# Patient Record
Sex: MALE | Race: ASIAN | Employment: OTHER | URBAN - METROPOLITAN AREA
[De-identification: names, ages, dates, MRNs, and addresses within clinical notes are randomized per-mention and may not be internally consistent; named-entity substitution may affect disease eponyms.]

---

## 2020-03-13 ENCOUNTER — HOSPITAL ENCOUNTER (OUTPATIENT)
Dept: ULTRASOUND IMAGING | Age: 80
Discharge: HOME | End: 2020-03-13
Payer: MEDICARE

## 2020-03-13 DIAGNOSIS — R14.0 ABDOMINAL DISTENSION: ICD-10-CM

## 2020-03-13 PROCEDURE — 76700 US EXAM ABDOM COMPLETE: CPT

## 2020-03-17 ENCOUNTER — HOSPITAL ENCOUNTER (OUTPATIENT)
Dept: CT IMAGING | Age: 80
Discharge: HOME | End: 2020-03-17
Attending: INTERNAL MEDICINE
Payer: MEDICARE

## 2020-03-17 DIAGNOSIS — R10.13 ABDOMINAL PAIN, EPIGASTRIC: ICD-10-CM

## 2020-03-17 LAB — CREAT BLD-MCNC: 1.3 MG/DL (ref 0.6–1.3)

## 2020-03-17 PROCEDURE — 74160 CT ABDOMEN W/CONTRAST: CPT

## 2020-03-17 PROCEDURE — 82565 ASSAY OF CREATININE: CPT

## 2020-03-17 PROCEDURE — 74011636320 HC RX REV CODE- 636/320: Performed by: RADIOLOGY

## 2020-03-17 RX ADMIN — IOPAMIDOL 100 ML: 755 INJECTION, SOLUTION INTRAVENOUS at 11:40

## 2020-06-19 ENCOUNTER — OFFICE VISIT (OUTPATIENT)
Dept: OTOLARYNGOLOGY | Facility: CLINIC | Age: 80
End: 2020-06-19
Payer: MEDICARE

## 2020-06-19 VITALS
WEIGHT: 163 LBS | SYSTOLIC BLOOD PRESSURE: 102 MMHG | BODY MASS INDEX: 24.71 KG/M2 | HEIGHT: 68 IN | HEART RATE: 80 BPM | DIASTOLIC BLOOD PRESSURE: 62 MMHG

## 2020-06-19 DIAGNOSIS — Z96.22 HISTORY OF PLACEMENT OF EAR TUBES: ICD-10-CM

## 2020-06-19 DIAGNOSIS — H92.01 RIGHT EAR PAIN: ICD-10-CM

## 2020-06-19 DIAGNOSIS — H91.90 HEARING LOSS, UNSPECIFIED HEARING LOSS TYPE, UNSPECIFIED LATERALITY: ICD-10-CM

## 2020-06-19 DIAGNOSIS — H93.8X1 BLOOD CLOT OF RIGHT EAR: Primary | ICD-10-CM

## 2020-06-19 PROCEDURE — 99999 PR PBB SHADOW E&M-NEW PATIENT-LVL IV: ICD-10-PCS | Mod: PBBFAC,,, | Performed by: OTOLARYNGOLOGY

## 2020-06-19 PROCEDURE — 99203 PR OFFICE/OUTPT VISIT, NEW, LEVL III, 30-44 MIN: ICD-10-PCS | Mod: S$PBB,,, | Performed by: OTOLARYNGOLOGY

## 2020-06-19 PROCEDURE — 99204 OFFICE O/P NEW MOD 45 MIN: CPT | Mod: PBBFAC | Performed by: OTOLARYNGOLOGY

## 2020-06-19 PROCEDURE — 99203 OFFICE O/P NEW LOW 30 MIN: CPT | Mod: S$PBB,,, | Performed by: OTOLARYNGOLOGY

## 2020-06-19 PROCEDURE — 99999 PR PBB SHADOW E&M-NEW PATIENT-LVL IV: CPT | Mod: PBBFAC,,, | Performed by: OTOLARYNGOLOGY

## 2020-06-19 RX ORDER — EZETIMIBE 10 MG/1
10 TABLET ORAL DAILY
COMMUNITY

## 2020-06-19 RX ORDER — ASPIRIN 325 MG
325 TABLET ORAL DAILY
COMMUNITY

## 2020-06-19 RX ORDER — BUPROPION HYDROCHLORIDE 150 MG/1
150 TABLET ORAL DAILY
COMMUNITY

## 2020-06-19 RX ORDER — LOSARTAN POTASSIUM 25 MG/1
25 TABLET ORAL DAILY
COMMUNITY

## 2020-06-19 RX ORDER — METOPROLOL TARTRATE 25 MG/1
12.5 TABLET, FILM COATED ORAL DAILY
COMMUNITY

## 2020-06-19 RX ORDER — OXYBUTYNIN CHLORIDE 5 MG/1
5 TABLET ORAL DAILY
COMMUNITY

## 2020-06-19 RX ORDER — ESCITALOPRAM OXALATE 20 MG/1
20 TABLET ORAL DAILY
COMMUNITY

## 2020-06-19 RX ORDER — ATORVASTATIN CALCIUM 40 MG/1
40 TABLET, FILM COATED ORAL DAILY
COMMUNITY

## 2020-06-19 RX ORDER — CLOPIDOGREL BISULFATE 75 MG/1
75 TABLET ORAL DAILY
COMMUNITY

## 2020-06-19 NOTE — PATIENT INSTRUCTIONS
1.  Keep ears dry.  2.  No hearing aid in right ear.  3.  No Q-tips or self manipulation to ears.  4.  Appointment to Dr. Vivek Alvarado or Teofilo Iqbal.  Appointment with Dr. Iqbal on June 24, 2020 at 9:00 am.

## 2020-06-19 NOTE — PROGRESS NOTES
OTOLARYNGOLOGY CLINIC    Subjective:       Patient ID: Michi Mac is a 80 y.o. male.    Chief Complaint: Other (Right ear bleeding and pain.)    HPI  Patient is a 80 year old male from [place] with his PCP in [facility] with the first time visit to [facility].  Patient complains of bleeding from the right ear last pm with pain to the right ear.  Patient also states that he has a hearing loss, problems with flying with the placement of middle ear ventilation tubes to allow him to fly (placement approximately 6 months ago).  This was done to help him equalize the air pressure with airplane flying since he travels frequently.  Patient takes Plavix 75 mg daily and  per day.  Patients hearing problem has been for over 5 years.  One year ago he had an ear infection: treated and resolved.  Pain to the right ear is 4 on a scale from 0-10.  Patient wears hearing aids to both ears.  Cigarettes: 20 pack year history.  No T&A.      History reviewed. No pertinent past medical history.    History reviewed. No pertinent surgical history.      Current Outpatient Medications:     aspirin 325 MG tablet, Take 325 mg by mouth once daily., Disp: , Rfl:     atorvastatin (LIPITOR) 40 MG tablet, Take 40 mg by mouth once daily., Disp: , Rfl:     buPROPion (WELLBUTRIN XL) 150 MG TB24 tablet, Take 150 mg by mouth once daily., Disp: , Rfl:     clopidogreL (PLAVIX) 75 mg tablet, Take 75 mg by mouth once daily., Disp: , Rfl:     escitalopram oxalate (LEXAPRO) 20 MG tablet, Take 20 mg by mouth once daily., Disp: , Rfl:     ezetimibe (ZETIA) 10 mg tablet, Take 10 mg by mouth once daily., Disp: , Rfl:     losartan (COZAAR) 25 MG tablet, Take 25 mg by mouth once daily., Disp: , Rfl:     metoprolol tartrate (LOPRESSOR) 25 MG tablet, Take 25 mg by mouth 2 (two) times daily. 1/2 tab qd, Disp: , Rfl:     oxybutynin (DITROPAN) 5 MG Tab, Take 5 mg by mouth 3 (three) times daily., Disp: , Rfl:     Review of patient's allergies  indicates:  No Known Allergies    Social History     Socioeconomic History    Marital status:      Spouse name: Not on file    Number of children: Not on file    Years of education: Not on file    Highest education level: Not on file   Occupational History    Not on file   Social Needs    Financial resource strain: Not on file    Food insecurity     Worry: Not on file     Inability: Not on file    Transportation needs     Medical: Not on file     Non-medical: Not on file   Tobacco Use    Smoking status: Former Smoker     Years: 45.00     Types: Cigarettes   Substance and Sexual Activity    Alcohol use: Yes     Alcohol/week: 1.0 standard drinks     Types: 1 Glasses of wine per week    Drug use: Not on file    Sexual activity: Not on file   Lifestyle    Physical activity     Days per week: Not on file     Minutes per session: Not on file    Stress: Not on file   Relationships    Social connections     Talks on phone: Not on file     Gets together: Not on file     Attends Orthodoxy service: Not on file     Active member of club or organization: Not on file     Attends meetings of clubs or organizations: Not on file     Relationship status: Not on file   Other Topics Concern    Not on file   Social History Narrative    Not on file       History reviewed. No pertinent family history.    Review of Systems   Constitutional: Negative for fatigue and fever.   HENT: Positive for ear discharge, ear pain and hearing loss. Negative for congestion, facial swelling, nosebleeds, postnasal drip, rhinorrhea, sinus pressure, sneezing, sore throat, tinnitus and voice change.         Middle ear ventilation tubes in both ears.  Bleeding from the right ear with bright red blood and old partially clotted blood.   Eyes: Negative for discharge.   Respiratory: Negative for cough.    Cardiovascular: Negative for chest pain.   Gastrointestinal: Negative for vomiting.   Genitourinary: Negative for difficulty urinating.    Musculoskeletal: Negative for neck pain.   Skin: Negative for rash.   Neurological: Negative for headaches.   Hematological: Negative for adenopathy.   Psychiatric/Behavioral: Negative for sleep disturbance.       Objective:     Physical Exam  Constitutional:       Appearance: He is well-developed and well-groomed.   HENT:      Head: Normocephalic and atraumatic.      Right Ear: Hearing and external ear normal.      Left Ear: Hearing, ear canal and external ear normal.      Ears:        Nose: Nose normal. No nasal deformity, septal deviation or rhinorrhea.      Mouth/Throat:      Mouth: No oral lesions.      Pharynx: Uvula midline. No oropharyngeal exudate, posterior oropharyngeal erythema or uvula swelling.   Eyes:      General:         Right eye: No discharge.         Left eye: No discharge.      Extraocular Movements: Extraocular movements intact.      Pupils: Pupils are equal, round, and reactive to light.   Neck:      Musculoskeletal: Normal range of motion and neck supple.      Thyroid: No thyromegaly.   Pulmonary:      Effort: Pulmonary effort is normal.   Musculoskeletal: Normal range of motion.   Lymphadenopathy:      Cervical: No cervical adenopathy.   Skin:     General: Skin is warm.   Neurological:      Mental Status: He is alert and oriented to person, place, and time.   Psychiatric:         Behavior: Behavior is cooperative.          Assessment & Plan:         Problem List Items Addressed This Visit     None      Visit Diagnoses     Blood clot of right ear    -  Primary    History of placement of ear tubes        Hearing loss, unspecified hearing loss type, unspecified laterality        Right ear pain            Patient Instructions   1.  Keep ears dry.  2.  No hearing aid in right ear.  3.  No Q-tips or self manipulation to ears.  4.  Appointment to Dr. Vivek Alvarado or Teofilo Iqbal.  Appointment with Dr. Iqbal on June 24, 2020 at 9:00 am.

## 2020-06-24 ENCOUNTER — OFFICE VISIT (OUTPATIENT)
Dept: OTOLARYNGOLOGY | Facility: CLINIC | Age: 80
End: 2020-06-24
Payer: MEDICARE

## 2020-06-24 ENCOUNTER — TELEPHONE (OUTPATIENT)
Dept: OTOLARYNGOLOGY | Facility: CLINIC | Age: 80
End: 2020-06-24

## 2020-06-24 VITALS — WEIGHT: 160 LBS | BODY MASS INDEX: 24.33 KG/M2

## 2020-06-24 DIAGNOSIS — H69.93 CHRONIC DYSFUNCTION OF BOTH EUSTACHIAN TUBES: ICD-10-CM

## 2020-06-24 DIAGNOSIS — H93.8X1: Primary | ICD-10-CM

## 2020-06-24 DIAGNOSIS — H91.13 PRESBYCUSIS OF BOTH EARS: ICD-10-CM

## 2020-06-24 PROCEDURE — 99999 PR PBB SHADOW E&M-EST. PATIENT-LVL II: ICD-10-PCS | Mod: PBBFAC,,, | Performed by: OTOLARYNGOLOGY

## 2020-06-24 PROCEDURE — 99999 PR PBB SHADOW E&M-EST. PATIENT-LVL II: CPT | Mod: PBBFAC,,, | Performed by: OTOLARYNGOLOGY

## 2020-06-24 PROCEDURE — 99214 OFFICE O/P EST MOD 30 MIN: CPT | Mod: S$PBB,,, | Performed by: OTOLARYNGOLOGY

## 2020-06-24 PROCEDURE — 99214 PR OFFICE/OUTPT VISIT, EST, LEVL IV, 30-39 MIN: ICD-10-PCS | Mod: S$PBB,,, | Performed by: OTOLARYNGOLOGY

## 2020-06-24 PROCEDURE — 99212 OFFICE O/P EST SF 10 MIN: CPT | Mod: PBBFAC | Performed by: OTOLARYNGOLOGY

## 2020-06-24 RX ORDER — CIPROFLOXACIN AND DEXAMETHASONE 3; 1 MG/ML; MG/ML
3 SUSPENSION/ DROPS AURICULAR (OTIC) 2 TIMES DAILY
Qty: 7.5 ML | Refills: 0 | Status: SHIPPED | OUTPATIENT
Start: 2020-06-24 | End: 2020-07-04

## 2020-06-24 NOTE — TELEPHONE ENCOUNTER
----- Message from Carolyn Alvarado sent at 6/24/2020  2:37 PM CDT -----  Regarding: speak with nurse  Contact: patient wife  [phone]-please call above patient wife need to speak with the nurse waiting on a call back thanks.

## 2020-06-24 NOTE — PROGRESS NOTES
Subjective:      Michi Mac is a 80 y.o. male who was self-referred for ear bleeding.    Mr. Mac present to clinic for the evaluation of ear bleeding.  He reports the bleeding from the right ear started last week and he seen by  on 6/19 for this problem.  Dr. Plaza instructed the patient to keep ears dry, No hearing aid in right ear, No Q-tips or self manipulation to ears and to follow-up Dr. Iqbal today.     Today he denies any more bleeding from the ear.  His pain is a 3-4/10 from the right ear.  He has a history of bilateral PE tubes that were placed by his ENT in [place] about 6 months ago for issues he was having due to frequent flying.       Past Medical History  He has no past medical history on file.    Past Surgical History  He has no past surgical history on file.    Family History  His family history is not on file.    Social History  He reports that he has quit smoking. His smoking use included cigarettes. He quit after 45.00 years of use. He does not have any smokeless tobacco history on file. He reports current alcohol use of about 1.0 standard drinks of alcohol per week.    Allergies  He has No Known Allergies.    Medications  He has a current medication list which includes the following prescription(s): aspirin, atorvastatin, bupropion, clopidogrel, escitalopram oxalate, ezetimibe, losartan, metoprolol tartrate, oxybutynin, and ciprodex.    Review of Systems   Constitutional: Negative for activity change, appetite change, chills and fever.   HENT: Positive for ear pain and hearing loss. Negative for dental problem, drooling, facial swelling, mouth sores, sinus pressure, sinus pain, tinnitus, trouble swallowing and voice change.    Eyes: Negative for pain, discharge, redness and itching.   Respiratory: Negative for cough, chest tightness, shortness of breath and wheezing.    Cardiovascular: Negative for chest pain.   Gastrointestinal: Negative for nausea and vomiting.    Endocrine: Negative for cold intolerance and heat intolerance.   Neurological: Negative for dizziness, syncope and light-headedness.   Hematological: Negative for adenopathy.   Psychiatric/Behavioral: Negative for confusion and dysphoric mood. The patient is not nervous/anxious.           Objective:     Wt 72.6 kg (160 lb)   BMI 24.33 kg/m²      Constitutional:   He is oriented to person, place, and time. Vital signs are normal. He appears well-developed and well-nourished. He appears alert. He is cooperative.  Non-toxic appearance. Normal speech.      Head:  Normocephalic and atraumatic. Head is without abrasion, without contusion, without laceration, without right periorbital erythema and without left periorbital erythema. Facial strength is normal.      Ears:    Right Ear: No lacerations. No drainage, swelling or tenderness. No foreign bodies. No mastoid tenderness. Tympanic membrane mobility is normal. No PE tube. No hemotympanum. Decreased hearing is noted.   Left Ear: No lacerations. No drainage, swelling or tenderness. No foreign bodies. No mastoid tenderness. Tympanic membrane is not scarred, not erythematous and not retracted. Tympanic membrane mobility is normal.  No PE tube. No hemotympanum. Decreased hearing is noted.   Ears:      Nose:  No rhinorrhea or nose lacerations. Turbinates normal, no turbinate masses and no turbinate hypertrophy.  Right sinus exhibits no maxillary sinus tenderness and no frontal sinus tenderness. Left sinus exhibits no maxillary sinus tenderness and no frontal sinus tenderness.     Neck:  Trachea normal, phonation normal and full range of motion with neck supple.     Pulmonary/Chest:   Effort normal, breath sounds normal and effort and breath sounds normal.     Psychiatric:   He has a normal mood and affect. His speech is normal and behavior is normal. His mood appears not anxious.     Neurological:   He is alert and oriented to person, place, and time.        Procedure    None    Data Reviewed    No results found for: WBC  No results found for: PLT   No results found for: CREATININE  No results found for: TSH  No results found for: GLU  No results found for: HGBA1C         Assessment:     1. Blood clot of ear, right         Plan:     I had a long discussion with the patient regarding his condition and the further workup and management options.    It appears that Mr. Mac is having bleeding from the ear (although not active today). An exact source was unable to be visualized under microscope due to large, hard clot lateral to TM.  Multiple attempts to clear the blood clot were made, but given its location and Mr. Mac's sensitivity it was unable to be removed.  Mr. Mac instructed to start Ciprodex drops and follow up with Dr. Iqbal in 2 weeks, however he reports he is returning to [place], [place] tomorrow.  Patient instructed to follow-up with the ENT who placed his tubes when he gets home.  Also instructed to avoid using his right hearing aid until he is seen by his provider.    Diagnoses and all orders for this visit:      Extensive and prolonged discussion >30 min of pt's ear problems done with multiple repetitive questions answered.    Blood clot of ear, right  -     ciprofloxacin-dexamethasone 0.3-0.1% (CIPRODEX) 0.3-0.1 % DrpS; Place 3 drops into the right ear 2 (two) times daily. for 10 days  -     Follow-up with an ENT provider in the next 2 weeks

## 2020-06-26 ENCOUNTER — TELEPHONE (OUTPATIENT)
Dept: OTOLARYNGOLOGY | Facility: CLINIC | Age: 80
End: 2020-06-26

## 2020-06-26 NOTE — TELEPHONE ENCOUNTER
Medication called into [facility] pharmacy  [facility], [address] [phone] CIPRO DEX 0.3-0.1% Ear Drop spoke with Shayna , previously spoke with son there was multiple pharmacy site in his chart the son stated he will call his father and find out which pharmacy he prefer.

## 2020-10-07 ENCOUNTER — HOSPITAL ENCOUNTER (EMERGENCY)
Facility: OTHER | Age: 80
Discharge: HOME | End: 2020-10-08
Attending: EMERGENCY MEDICINE
Payer: MEDICARE

## 2020-10-07 DIAGNOSIS — E86.0 DEHYDRATION: ICD-10-CM

## 2020-10-07 DIAGNOSIS — R55 NEAR SYNCOPE: Primary | ICD-10-CM

## 2020-10-07 DIAGNOSIS — D64.9 ANEMIA, UNSPECIFIED TYPE: ICD-10-CM

## 2020-10-07 DIAGNOSIS — I95.9 HYPOTENSION, UNSPECIFIED HYPOTENSION TYPE: ICD-10-CM

## 2020-10-07 DIAGNOSIS — R42 EPISODIC LIGHTHEADEDNESS: ICD-10-CM

## 2020-10-07 DIAGNOSIS — R73.9 HYPERGLYCEMIA: ICD-10-CM

## 2020-10-07 LAB
ALBUMIN SERPL BCP-MCNC: 3.3 G/DL (ref 3.5–5.2)
ALP SERPL-CCNC: 45 U/L (ref 55–135)
ALT SERPL W/O P-5'-P-CCNC: 40 U/L (ref 10–44)
ANION GAP SERPL CALC-SCNC: 10 MMOL/L (ref 8–16)
AST SERPL-CCNC: 27 U/L (ref 10–40)
BASOPHILS # BLD AUTO: 0.03 K/UL (ref 0–0.2)
BASOPHILS NFR BLD: 0.5 % (ref 0–1.9)
BILIRUB SERPL-MCNC: 0.8 MG/DL (ref 0.1–1)
BUN SERPL-MCNC: 34 MG/DL (ref 8–23)
CALCIUM SERPL-MCNC: 8.4 MG/DL (ref 8.7–10.5)
CHLORIDE SERPL-SCNC: 108 MMOL/L (ref 95–110)
CO2 SERPL-SCNC: 21 MMOL/L (ref 23–29)
CREAT SERPL-MCNC: 1.8 MG/DL (ref 0.5–1.4)
DIFFERENTIAL METHOD: ABNORMAL
EOSINOPHIL # BLD AUTO: 0.1 K/UL (ref 0–0.5)
EOSINOPHIL NFR BLD: 1.5 % (ref 0–8)
ERYTHROCYTE [DISTWIDTH] IN BLOOD BY AUTOMATED COUNT: 13.1 % (ref 11.5–14.5)
EST. GFR  (AFRICAN AMERICAN): 40 ML/MIN/1.73 M^2
EST. GFR  (NON AFRICAN AMERICAN): 34 ML/MIN/1.73 M^2
GLUCOSE SERPL-MCNC: 182 MG/DL (ref 70–110)
HCT VFR BLD AUTO: 38.6 % (ref 40–54)
HGB BLD-MCNC: 12.6 G/DL (ref 14–18)
IMM GRANULOCYTES # BLD AUTO: 0.01 K/UL (ref 0–0.04)
IMM GRANULOCYTES NFR BLD AUTO: 0.2 % (ref 0–0.5)
LYMPHOCYTES # BLD AUTO: 1.3 K/UL (ref 1–4.8)
LYMPHOCYTES NFR BLD: 23.4 % (ref 18–48)
MCH RBC QN AUTO: 30.4 PG (ref 27–31)
MCHC RBC AUTO-ENTMCNC: 32.6 G/DL (ref 32–36)
MCV RBC AUTO: 93 FL (ref 82–98)
MONOCYTES # BLD AUTO: 0.5 K/UL (ref 0.3–1)
MONOCYTES NFR BLD: 9.6 % (ref 4–15)
NEUTROPHILS # BLD AUTO: 3.6 K/UL (ref 1.8–7.7)
NEUTROPHILS NFR BLD: 64.8 % (ref 38–73)
NRBC BLD-RTO: 0 /100 WBC
PLATELET # BLD AUTO: 142 K/UL (ref 150–350)
PMV BLD AUTO: 10 FL (ref 9.2–12.9)
POCT GLUCOSE: 126 MG/DL (ref 70–110)
POTASSIUM SERPL-SCNC: 4.4 MMOL/L (ref 3.5–5.1)
PROT SERPL-MCNC: 6.2 G/DL (ref 6–8.4)
RBC # BLD AUTO: 4.15 M/UL (ref 4.6–6.2)
SODIUM SERPL-SCNC: 139 MMOL/L (ref 136–145)
TROPONIN I SERPL DL<=0.01 NG/ML-MCNC: 0.01 NG/ML (ref 0–0.03)
WBC # BLD AUTO: 5.51 K/UL (ref 3.9–12.7)

## 2020-10-07 PROCEDURE — 25000003 PHARM REV CODE 250: Performed by: EMERGENCY MEDICINE

## 2020-10-07 PROCEDURE — 80053 COMPREHEN METABOLIC PANEL: CPT

## 2020-10-07 PROCEDURE — 99285 EMERGENCY DEPT VISIT HI MDM: CPT | Mod: 25

## 2020-10-07 PROCEDURE — 85025 COMPLETE CBC W/AUTO DIFF WBC: CPT

## 2020-10-07 PROCEDURE — 93005 ELECTROCARDIOGRAM TRACING: CPT

## 2020-10-07 PROCEDURE — 96361 HYDRATE IV INFUSION ADD-ON: CPT

## 2020-10-07 PROCEDURE — 93010 ELECTROCARDIOGRAM REPORT: CPT | Mod: ,,, | Performed by: INTERNAL MEDICINE

## 2020-10-07 PROCEDURE — 96360 HYDRATION IV INFUSION INIT: CPT

## 2020-10-07 PROCEDURE — 84484 ASSAY OF TROPONIN QUANT: CPT

## 2020-10-07 PROCEDURE — 93010 EKG 12-LEAD: ICD-10-PCS | Mod: ,,, | Performed by: INTERNAL MEDICINE

## 2020-10-07 PROCEDURE — 82962 GLUCOSE BLOOD TEST: CPT

## 2020-10-07 RX ADMIN — SODIUM CHLORIDE 1000 ML: 0.9 INJECTION, SOLUTION INTRAVENOUS at 10:10

## 2020-10-07 RX ADMIN — SODIUM CHLORIDE 500 ML: 0.9 INJECTION, SOLUTION INTRAVENOUS at 10:10

## 2020-10-08 VITALS
RESPIRATION RATE: 17 BRPM | SYSTOLIC BLOOD PRESSURE: 133 MMHG | BODY MASS INDEX: 24.25 KG/M2 | DIASTOLIC BLOOD PRESSURE: 67 MMHG | HEIGHT: 68 IN | TEMPERATURE: 98 F | OXYGEN SATURATION: 97 % | WEIGHT: 160 LBS | HEART RATE: 68 BPM

## 2020-10-08 LAB
ANION GAP SERPL CALC-SCNC: 8 MMOL/L (ref 8–16)
BUN SERPL-MCNC: 31 MG/DL (ref 8–23)
CALCIUM SERPL-MCNC: 8.2 MG/DL (ref 8.7–10.5)
CHLORIDE SERPL-SCNC: 111 MMOL/L (ref 95–110)
CO2 SERPL-SCNC: 21 MMOL/L (ref 23–29)
CREAT SERPL-MCNC: 1.5 MG/DL (ref 0.5–1.4)
EST. GFR  (AFRICAN AMERICAN): 49 ML/MIN/1.73 M^2
EST. GFR  (NON AFRICAN AMERICAN): 43 ML/MIN/1.73 M^2
GLUCOSE SERPL-MCNC: 108 MG/DL (ref 70–110)
POTASSIUM SERPL-SCNC: 4.5 MMOL/L (ref 3.5–5.1)
SODIUM SERPL-SCNC: 140 MMOL/L (ref 136–145)
TROPONIN I SERPL DL<=0.01 NG/ML-MCNC: 0.01 NG/ML (ref 0–0.03)

## 2020-10-08 PROCEDURE — 96361 HYDRATE IV INFUSION ADD-ON: CPT

## 2020-10-08 PROCEDURE — 84484 ASSAY OF TROPONIN QUANT: CPT

## 2020-10-08 PROCEDURE — 80048 BASIC METABOLIC PNL TOTAL CA: CPT

## 2020-10-08 NOTE — ED PROVIDER NOTES
Encounter Date: 10/7/2020    SCRIBE #1 NOTE: I, Daniel Pearce, am scribing for, and in the presence of, Dr. Amin.       History     Chief Complaint   Patient presents with    Hypotension     EMS reports systolic 90, pt responsive and sitting up at home, GCS 15     Time seen by provider: 8:53 PM    This is a 80 y.o. male who presents with complaint of hypotension that occurred approximately one hour ago. The patient was at a restaurant with his family, when he began to feel lightheaded. He reports getting up from being seated and noticing the lightheadedness more prominently. Once at home the patient's son checked his blood pressure and it was 90 systolic. The patient does not check his blood pressure on a daily basis at home. He works out four times a week at a cardiac rehab in [place], where he states an average systolic blood pressure is 110. He denies any recent change or new medications. The only thing different about his day is that he took his daily medications in the afternoon, when he normally takes his medications in the morning. The patient's son was concerned because when EMS was assessing the patient, he looked more sleepy than normal. This is the extent of the patient's complaints at this time. The patient admits to drinking a manoj tonight, while at the restaurant. He admits to having a glass of wine daily. He denies smoking. The patient has had stents placed 15 years ago with no problems since.    The history is provided by the patient and a relative (son).     Review of patient's allergies indicates:   Allergen Reactions    Codeine      Past Medical History:   Diagnosis Date    Hyperlipidemia     Hypertension     Prediabetes      History reviewed. No pertinent surgical history.  History reviewed. No pertinent family history.  Social History     Tobacco Use    Smoking status: Former Smoker     Years: 45.00     Types: Cigarettes    Smokeless tobacco: Never Used   Substance Use Topics     Alcohol use: Yes     Alcohol/week: 1.0 standard drinks     Types: 1 Glasses of wine per week    Drug use: Never     Review of Systems   Constitutional: Negative for chills and fever.   HENT: Negative for congestion and sore throat.    Eyes: Negative for photophobia and redness.   Respiratory: Negative for cough and shortness of breath.    Cardiovascular: Negative for chest pain and palpitations.   Gastrointestinal: Negative for abdominal pain, nausea and vomiting.   Genitourinary: Negative for dysuria.   Musculoskeletal: Negative for back pain.   Skin: Negative for rash.   Neurological: Positive for light-headedness. Negative for weakness and headaches.   Psychiatric/Behavioral: Negative for confusion.       Physical Exam     Initial Vitals   BP Pulse Resp Temp SpO2   10/07/20 2021 10/07/20 2021 10/07/20 2021 10/07/20 2021 10/07/20 2024   99/60 68 18 98.4 °F (36.9 °C) 97 %      MAP       --                Physical Exam    Nursing note and vitals reviewed.  Constitutional: He appears well-developed and well-nourished. He is not diaphoretic. No distress.   HENT:   Head: Normocephalic and atraumatic.   Mouth/Throat: Oropharynx is clear and moist.   Moist mucus membranes. TMs clear and intact bilaterally.    Eyes: Conjunctivae and EOM are normal. Pupils are equal, round, and reactive to light.   Conjunctivae pink, clear, and intact.    Neck: Normal range of motion. Neck supple.   No cervical lymphadenopathy.    Cardiovascular: Normal rate, regular rhythm, S1 normal, S2 normal and normal heart sounds. Exam reveals no gallop and no friction rub.    No murmur heard.  Pulmonary/Chest: Breath sounds normal. No respiratory distress. He has no wheezes. He has no rhonchi. He has no rales.   Lungs clear to auscultation bilaterally.    Abdominal: Soft. Bowel sounds are normal. There is no abdominal tenderness. There is no rebound and no guarding.   No audible bruits.    Musculoskeletal: Normal range of motion. No tenderness  or edema.      Comments: No lower extremity edema.    Lymphadenopathy:     He has no cervical adenopathy.   Neurological: He is alert and oriented to person, place, and time.   Skin: Skin is warm and dry. Capillary refill takes less than 2 seconds. No rash noted. No pallor.   Warm and dry. No skin tenting, rashes, or lesions.     Psychiatric: He has a normal mood and affect. His behavior is normal. Judgment and thought content normal.         ED Course   Procedures  Labs Reviewed   CBC W/ AUTO DIFFERENTIAL - Abnormal; Notable for the following components:       Result Value    RBC 4.15 (*)     Hemoglobin 12.6 (*)     Hematocrit 38.6 (*)     Platelets 142 (*)     All other components within normal limits   COMPREHENSIVE METABOLIC PANEL - Abnormal; Notable for the following components:    CO2 21 (*)     Glucose 182 (*)     BUN, Bld 34 (*)     Creatinine 1.8 (*)     Calcium 8.4 (*)     Albumin 3.3 (*)     Alkaline Phosphatase 45 (*)     eGFR if  40 (*)     eGFR if non  34 (*)     All other components within normal limits   BASIC METABOLIC PANEL - Abnormal; Notable for the following components:    Chloride 111 (*)     CO2 21 (*)     BUN, Bld 31 (*)     Creatinine 1.5 (*)     Calcium 8.2 (*)     eGFR if  49 (*)     eGFR if non  43 (*)     All other components within normal limits   POCT GLUCOSE - Abnormal; Notable for the following components:    POCT Glucose 126 (*)     All other components within normal limits   TROPONIN I   TROPONIN I     EKG Readings: (Independently Interpreted)   Initial Reading: No STEMI. Rhythm: Normal Sinus Rhythm. Heart Rate: 70.   T wave inversions in lead 1 and AVL.     ECG Results          EKG 12-lead (Final result)  Result time 10/08/20 07:59:49    Final result by Interface, Lab In [facility] (10/08/20 07:59:49)                 Narrative:    Test Reason : R42,    Vent. Rate : 070 BPM     Atrial Rate : 070 BPM     P-R Int : 158 ms           QRS Dur : 086 ms      QT Int : 394 ms       P-R-T Axes : 057 026 109 degrees     QTc Int : 425 ms    Normal sinus rhythm  Septal infarct ,age undetermined  T wave abnormality, consider lateral ischemia  Abnormal ECG    Confirmed by Marcellus Coelho MD ([id number]) on 10/8/2020 7:59:41 AM    Referred By: THAO   SELF           Confirmed By:Marcellus Coelho MD                            Imaging Results          X-Ray Chest AP Portable (Final result)  Result time 10/07/20 21:37:35    Final result by Guido Roque MD (10/07/20 21:37:35)                 Impression:      No acute cardiopulmonary process identified.      Electronically signed by: Guido Roque MD  Date:    10/07/2020  Time:    21:37             Narrative:    EXAMINATION:  XR CHEST AP PORTABLE    CLINICAL HISTORY:  Chest Pain;    TECHNIQUE:  Single frontal view of the chest was performed.    COMPARISON:  None    FINDINGS:  Cardiac silhouette is normal in size.  Lungs are symmetrically expanded.  Minimal left basilar atelectasis or scarring is noted.  Otherwise no evidence of focal consolidative process, pneumothorax, or significant pleural effusion.  No acute osseous abnormality identified.                              X-Rays:   Independently Interpreted Readings:   Chest X-Ray: No enlarged cardiac silhouette. No pulmonary edema. No consolidations.     Medical Decision Making:   History:   Old Medical Records: I decided to obtain old medical records.  Independently Interpreted Test(s):   I have ordered and independently interpreted X-rays - see prior notes.  I have ordered and independently interpreted EKG Reading(s) - see prior notes  Clinical Tests:   Lab Tests: Ordered and Reviewed  Radiological Study: Ordered and Reviewed  Medical Tests: Ordered and Reviewed            Scribe Attestation:   Scribe #1: I performed the above scribed service and the documentation accurately describes the services I performed. I attest to the accuracy of the  note.    Attending Attestation:           Physician Attestation for Scribe:  Physician Attestation Statement for Scribe #1: I, Dr. Amin, reviewed documentation, as scribed by Daniel Pearce in my presence, and it is both accurate and complete.         Attending ED Notes:   Emergent evaluation of 80-year-old male with complaint of a near syncopal episode with low blood pressure.  Patient states he took his high blood pressure medications this afternoon instead of in the morning.  Patient did have a small manoj this evening prior to the near syncopal event.  Patient is afebrile, nontoxic-appearing stable vital signs except for hypotension.  Patient is neurovascularly intact without focal neurologic deficits.  Patient denies any associated chest pain or shortness of breath.  Patient has no elevation white blood cell count.  H&H 12.6 and 38.6.  Blood glucose is 126.  No acute findings on CMP except for BUN and creatinine of 34 and 1.8.  Patient states he has no known history of renal insufficiency.  Troponin is 0.015.  No acute findings on EKG.  No acute findings on chest x-ray.  With the patient's permission I spoke with family member, Dr. Hernández regarding all patient's diagnosis, laboratory and diagnostic findings.  After administration of 1 L IV normal saline patient's blood pressure increased to 118/58.  On re-evaluation the patient states he feels well and has no complaints.  Troponin and BMP will be repeated at 12:30 a.m. final disposition and plan of care will go to Dr. Self.     On re-evaluation of the patient's chart patient was discharged by Dr. Self after finding an improvement in BUN and creatinine and no elevation in 2nd troponin.    Patient presentation is low risk for ACS, no indication of any acute cardiac event on EKG, chest x-ray is non-concerning, the patient has no risk factors for PE, workup is benign. No ongoing chest pain while in the Emergency Department.  The patient's second  troponin is negative. Risk stratification with score indicates patient is low risk for MACE within 60 days and unlikely to benefit from hospitalization.  At this point there is no need for emergent hospitalization. I discussed with the pt their risk stratification for chest pain and the need for follow up with cardiology for further testing and likely stress test within 72 hours. The pt understands. I will discharge with symptom control and have them follow up with primary care physician and cardiology for further workup of their pain. The patient is comfortable with this plan of going home this time.            ED Course as of Oct 10 0253   Thu Oct 08, 2020   0116 I was asked to follow-up on a repeat troponin and BMP.  BMP showed improvement the urine creatinine, troponin remained normal.  Will discharge patient.    [MA]      ED Course User Index  [MA] Merlin Self MD            Clinical Impression:     ICD-10-CM ICD-9-CM   1. Near syncope  R55 780.2   2. Episodic lightheadedness  R42 780.4   3. Hypotension, unspecified hypotension type  I95.9 458.9   4. Dehydration  E86.0 276.51   5. Anemia, unspecified type  D64.9 285.9   6. Hyperglycemia  R73.9 790.29                          ED Disposition Condition    Discharge Stable        ED Prescriptions     None        Follow-up Information     Follow up With Specialties Details Why Contact Info    Vignesh Downs MD  In 2 days  [address] [address] [phone]      Vignesh Downs MD  Schedule an appointment as soon as possible for a visit in 3 days For follow-up and re-evaluation [address] [address] [phone]      [facility]  As needed, for any new or worsening symptoms [address] [facility] [address]  [phone]                                       Jim Muir MD  10/07/20 2255       Jim Muir MD  10/10/20 [id number]

## 2020-10-08 NOTE — ED NOTES
Pt arrives to Ed with c/o hypotension. Pt reports being dehydrated. Pt received 500 ml bolus from EMS. Pt answering questions appropriately. Pt placed on BP cycling, pulse ox, and cardiac monitoring. Per Ems pt awake and alert upon arrival. PT reports taking BP medication without taking BP today. Pt denies CP or SOB. Pt 97% on RA

## 2020-10-13 ENCOUNTER — TELEPHONE (OUTPATIENT)
Dept: CARDIOLOGY | Facility: CLINIC | Age: 80
End: 2020-10-13

## 2020-10-13 ENCOUNTER — PATIENT MESSAGE (OUTPATIENT)
Dept: CARDIOLOGY | Facility: CLINIC | Age: 80
End: 2020-10-13

## 2020-10-13 ENCOUNTER — HOSPITAL ENCOUNTER (OUTPATIENT)
Dept: CARDIOLOGY | Facility: HOSPITAL | Age: 80
Discharge: HOME | End: 2020-10-13
Attending: INTERNAL MEDICINE
Payer: MEDICARE

## 2020-10-13 VITALS
SYSTOLIC BLOOD PRESSURE: 132 MMHG | HEIGHT: 68 IN | HEART RATE: 74 BPM | DIASTOLIC BLOOD PRESSURE: 75 MMHG | WEIGHT: 160 LBS | BODY MASS INDEX: 24.25 KG/M2

## 2020-10-13 DIAGNOSIS — I25.10 CORONARY ARTERY DISEASE, ANGINA PRESENCE UNSPECIFIED, UNSPECIFIED VESSEL OR LESION TYPE, UNSPECIFIED WHETHER NATIVE OR TRANSPLANTED HEART: ICD-10-CM

## 2020-10-13 DIAGNOSIS — I25.10 CORONARY ARTERY DISEASE, ANGINA PRESENCE UNSPECIFIED, UNSPECIFIED VESSEL OR LESION TYPE, UNSPECIFIED WHETHER NATIVE OR TRANSPLANTED HEART: Primary | ICD-10-CM

## 2020-10-13 LAB
ASCENDING AORTA: 3.39 CM
AV INDEX (PROSTH): 0.82
AV MEAN GRADIENT: 3 MMHG
AV PEAK GRADIENT: 5 MMHG
AV VALVE AREA: 2.46 CM2
AV VELOCITY RATIO: 0.8
BSA FOR ECHO PROCEDURE: 1.87 M2
CV ECHO LV RWT: 0.28 CM
DOP CALC AO PEAK VEL: 1.1 M/S
DOP CALC AO VTI: 22.85 CM
DOP CALC LVOT AREA: 3 CM2
DOP CALC LVOT DIAMETER: 1.95 CM
DOP CALC LVOT PEAK VEL: 0.88 M/S
DOP CALC LVOT STROKE VOLUME: 56.15 CM3
DOP CALCLVOT PEAK VEL VTI: 18.81 CM
E WAVE DECELERATION TIME: 163.45 MSEC
E/A RATIO: 1.17
E/E' RATIO: 11.87 M/S
ECHO LV POSTERIOR WALL: 0.75 CM (ref 0.6–1.1)
FRACTIONAL SHORTENING: 12 % (ref 28–44)
INTERVENTRICULAR SEPTUM: 0.61 CM (ref 0.6–1.1)
IVRT: 97.05 MSEC
LA MAJOR: 6.3 CM
LA MINOR: 6.79 CM
LA WIDTH: 4.33 CM
LEFT ATRIUM SIZE: 4.7 CM
LEFT ATRIUM VOLUME INDEX: 60.8 ML/M2
LEFT ATRIUM VOLUME: 113.06 CM3
LEFT INTERNAL DIMENSION IN SYSTOLE: 4.64 CM (ref 2.1–4)
LEFT VENTRICLE DIASTOLIC VOLUME INDEX: 72.51 ML/M2
LEFT VENTRICLE DIASTOLIC VOLUME: 134.79 ML
LEFT VENTRICLE MASS INDEX: 66 G/M2
LEFT VENTRICLE SYSTOLIC VOLUME INDEX: 53.5 ML/M2
LEFT VENTRICLE SYSTOLIC VOLUME: 99.44 ML
LEFT VENTRICULAR INTERNAL DIMENSION IN DIASTOLE: 5.29 CM (ref 3.5–6)
LEFT VENTRICULAR MASS: 122.11 G
LV LATERAL E/E' RATIO: 8.9 M/S
LV SEPTAL E/E' RATIO: 17.8 M/S
MV PEAK A VEL: 0.76 M/S
MV PEAK E VEL: 0.89 M/S
MV STENOSIS PRESSURE HALF TIME: 47.4 MS
MV VALVE AREA P 1/2 METHOD: 4.64 CM2
PISA MRMAX VEL: 0.05 M/S
PISA TR MAX VEL: 3.13 M/S
PULM VEIN S/D RATIO: 0.84
PV PEAK D VEL: 0.5 M/S
PV PEAK S VEL: 0.42 M/S
RA MAJOR: 5.43 CM
RA PRESSURE: 3 MMHG
RA WIDTH: 4.81 CM
RIGHT VENTRICULAR END-DIASTOLIC DIMENSION: 3.19 CM
RV TISSUE DOPPLER FREE WALL SYSTOLIC VELOCITY 1 (APICAL 4 CHAMBER VIEW): 9.09 CM/S
SINUS: 3.35 CM
STJ: 2.72 CM
TDI LATERAL: 0.1 M/S
TDI SEPTAL: 0.05 M/S
TDI: 0.08 M/S
TR MAX PG: 39 MMHG
TRICUSPID ANNULAR PLANE SYSTOLIC EXCURSION: 1.7 CM
TV REST PULMONARY ARTERY PRESSURE: 42 MMHG

## 2020-10-13 PROCEDURE — 93306 ECHO (CUPID ONLY): ICD-10-PCS | Mod: 26,,, | Performed by: INTERNAL MEDICINE

## 2020-10-13 PROCEDURE — 93306 TTE W/DOPPLER COMPLETE: CPT | Mod: 26,,, | Performed by: INTERNAL MEDICINE

## 2020-10-13 PROCEDURE — C8929 TTE W OR WO FOL WCON,DOPPLER: HCPCS

## 2020-10-13 PROCEDURE — 63600175 PHARM REV CODE 636 W HCPCS: Performed by: INTERNAL MEDICINE

## 2020-10-13 RX ADMIN — HUMAN ALBUMIN MICROSPHERES AND PERFLUTREN 0.66 MG: 10; .22 INJECTION, SOLUTION INTRAVENOUS at 04:10

## 2020-10-14 ENCOUNTER — TELEPHONE (OUTPATIENT)
Dept: CARDIOLOGY | Facility: CLINIC | Age: 80
End: 2020-10-14

## 2020-10-14 ENCOUNTER — OFFICE VISIT (OUTPATIENT)
Dept: CARDIOLOGY | Facility: CLINIC | Age: 80
End: 2020-10-14
Payer: MEDICARE

## 2020-10-14 VITALS
SYSTOLIC BLOOD PRESSURE: 138 MMHG | DIASTOLIC BLOOD PRESSURE: 70 MMHG | WEIGHT: 164.88 LBS | BODY MASS INDEX: 24.99 KG/M2 | HEART RATE: 73 BPM | HEIGHT: 68 IN

## 2020-10-14 DIAGNOSIS — I95.0 IDIOPATHIC HYPOTENSION: ICD-10-CM

## 2020-10-14 DIAGNOSIS — I10 HTN (HYPERTENSION), BENIGN: ICD-10-CM

## 2020-10-14 DIAGNOSIS — E78.5 DYSLIPIDEMIA: ICD-10-CM

## 2020-10-14 DIAGNOSIS — N18.30 ACUTE RENAL FAILURE WITH ACUTE TUBULAR NECROSIS SUPERIMPOSED ON STAGE 3 CHRONIC KIDNEY DISEASE, UNSPECIFIED WHETHER STAGE 3A OR 3B CKD: ICD-10-CM

## 2020-10-14 DIAGNOSIS — E55.9 VITAMIN D DEFICIENCY DISEASE: ICD-10-CM

## 2020-10-14 DIAGNOSIS — N17.0 ACUTE RENAL FAILURE WITH ACUTE TUBULAR NECROSIS SUPERIMPOSED ON STAGE 3 CHRONIC KIDNEY DISEASE, UNSPECIFIED WHETHER STAGE 3A OR 3B CKD: ICD-10-CM

## 2020-10-14 DIAGNOSIS — Z95.5 STENTED CORONARY ARTERY: ICD-10-CM

## 2020-10-14 DIAGNOSIS — R55 NEAR SYNCOPE: ICD-10-CM

## 2020-10-14 DIAGNOSIS — I25.10 CORONARY ARTERY DISEASE INVOLVING NATIVE CORONARY ARTERY OF NATIVE HEART WITHOUT ANGINA PECTORIS: ICD-10-CM

## 2020-10-14 DIAGNOSIS — I25.5 ISCHEMIC CARDIOMYOPATHY: Primary | ICD-10-CM

## 2020-10-14 PROBLEM — N17.9 ACUTE RENAL FAILURE SUPERIMPOSED ON STAGE 3 CHRONIC KIDNEY DISEASE: Status: ACTIVE | Noted: 2020-10-14

## 2020-10-14 PROBLEM — I95.9 HYPOTENSION: Status: ACTIVE | Noted: 2020-10-14

## 2020-10-14 PROBLEM — I25.2 OLD MI (MYOCARDIAL INFARCTION): Status: ACTIVE | Noted: 2020-10-14

## 2020-10-14 PROCEDURE — 99999 PR PBB SHADOW E&M-EST. PATIENT-LVL IV: ICD-10-PCS | Mod: PBBFAC,,, | Performed by: INTERNAL MEDICINE

## 2020-10-14 PROCEDURE — 99999 PR PBB SHADOW E&M-EST. PATIENT-LVL IV: CPT | Mod: PBBFAC,,, | Performed by: INTERNAL MEDICINE

## 2020-10-14 PROCEDURE — 99204 OFFICE O/P NEW MOD 45 MIN: CPT | Mod: S$PBB,,, | Performed by: INTERNAL MEDICINE

## 2020-10-14 PROCEDURE — 99214 OFFICE O/P EST MOD 30 MIN: CPT | Mod: PBBFAC | Performed by: INTERNAL MEDICINE

## 2020-10-14 PROCEDURE — 99204 PR OFFICE/OUTPT VISIT, NEW, LEVL IV, 45-59 MIN: ICD-10-PCS | Mod: S$PBB,,, | Performed by: INTERNAL MEDICINE

## 2020-10-14 RX ORDER — NITROGLYCERIN 0.4 MG/1
0.4 TABLET SUBLINGUAL EVERY 5 MIN PRN
Qty: 25 TABLET | Refills: 3 | Status: SHIPPED | OUTPATIENT
Start: 2020-10-14 | End: 2020-11-13

## 2020-10-14 RX ORDER — TESTOSTERONE GEL, 1% 10 MG/G
GEL TRANSDERMAL DAILY
COMMUNITY

## 2020-10-14 RX ORDER — PYRIDOXINE HCL (VITAMIN B6) 100 MG
50 TABLET ORAL DAILY
COMMUNITY

## 2020-10-14 RX ORDER — CHOLECALCIFEROL (VITAMIN D3) 25 MCG
1000 TABLET ORAL DAILY
COMMUNITY

## 2020-10-14 RX ORDER — LANOLIN ALCOHOL/MO/W.PET/CERES
100 CREAM (GRAM) TOPICAL DAILY
COMMUNITY

## 2020-10-14 NOTE — PROGRESS NOTES
Subjective:   Patient ID:  Michi Mac is a 80 y.o. male who presents for follow-up of Cardiomyopathy      HPI:  The patient is here for CAD-he was just in ER with hypotension and renal insuff. I saw his son 4 years ago and Dr STARR Mishra asked me to see him before he went back to [place].She had ordered an FARZANEH under my name but I canceled yesterday after seeing resting.He says LVEF was read as 50% 5-7 years ago. PTCA and MI were many years ago.    The patient has no chest pain, SOB, TIA, palpitations, syncope. Patient currently exercises many times per week.BP usually 110/70.        Review of Systems   Constitution: Negative for chills, decreased appetite, diaphoresis, fever, malaise/fatigue, night sweats, weight gain and weight loss.   HENT: Negative for congestion, hoarse voice, nosebleeds, sore throat and tinnitus.    Eyes: Negative for blurred vision, double vision, vision loss in left eye, vision loss in right eye, visual disturbance and visual halos.   Cardiovascular: Positive for near-syncope. Negative for chest pain, claudication, cyanosis, dyspnea on exertion, irregular heartbeat, leg swelling, orthopnea, palpitations, paroxysmal nocturnal dyspnea and syncope.   Respiratory: Negative for cough, hemoptysis, shortness of breath, sleep disturbances due to breathing, snoring, sputum production and wheezing.    Endocrine: Negative for cold intolerance, heat intolerance, polydipsia, polyphagia and polyuria.   Hematologic/Lymphatic: Negative for adenopathy and bleeding problem. Does not bruise/bleed easily.   Skin: Negative for color change, dry skin, flushing, itching, nail changes, poor wound healing, rash, skin cancer, suspicious lesions and unusual hair distribution.   Musculoskeletal: Negative for arthritis, back pain, falls, gout, joint pain, joint swelling, muscle cramps, muscle weakness, myalgias and stiffness.   Gastrointestinal: Negative for abdominal pain, anorexia, change in bowel habit, constipation,  "diarrhea, dysphagia, heartburn, hematemesis, hematochezia, melena and vomiting.   Genitourinary: Negative for decreased libido, dysuria, hematuria, hesitancy and urgency.   Neurological: Positive for light-headedness. Negative for excessive daytime sleepiness, dizziness, focal weakness, headaches, loss of balance, numbness, paresthesias, seizures, sensory change, tremors, vertigo and weakness.   Psychiatric/Behavioral: Negative for altered mental status, depression, hallucinations, memory loss, substance abuse and suicidal ideas. The patient does not have insomnia and is not nervous/anxious.    Allergic/Immunologic: Negative for environmental allergies and hives.       Objective: /70 (BP Location: Left arm, Patient Position: Sitting, BP Method: Medium (Automatic))   Pulse 73   Ht 5' 8" (1.727 m)   Wt 74.8 kg (164 lb 14.5 oz)   BMI 25.07 kg/m²      Physical Exam   Constitutional: He is oriented to person, place, and time. He appears well-developed and well-nourished. No distress.   HENT:   Head: Normocephalic.   Eyes: Pupils are equal, round, and reactive to light. EOM are normal.   Neck: Normal range of motion. No thyromegaly present.   Cardiovascular: Normal rate, regular rhythm, normal heart sounds and intact distal pulses. Exam reveals no gallop and no friction rub.   No murmur heard.  Pulses:       Carotid pulses are 3+ on the right side and 3+ on the left side.       Radial pulses are 3+ on the right side and 3+ on the left side.        Femoral pulses are 3+ on the right side and 3+ on the left side.       Popliteal pulses are 3+ on the right side and 3+ on the left side.        Dorsalis pedis pulses are 3+ on the right side and 3+ on the left side.        Posterior tibial pulses are 3+ on the right side and 3+ on the left side.   Pulmonary/Chest: Effort normal and breath sounds normal. No respiratory distress. He has no wheezes. He has no rales. He exhibits no tenderness.   Abdominal: Soft. He " exhibits no distension and no mass. There is no abdominal tenderness.   Musculoskeletal: Normal range of motion.   Lymphadenopathy:     He has no cervical adenopathy.   Neurological: He is alert and oriented to person, place, and time.   Skin: Skin is warm. He is not diaphoretic. No cyanosis. Nails show no clubbing.   Psychiatric: He has a normal mood and affect. His speech is normal and behavior is normal. Judgment and thought content normal. Cognition and memory are normal.       Assessment:     1. Ischemic cardiomyopathy    2. Coronary artery disease involving native coronary artery of native heart without angina pectoris    3. Stented coronary artery    4. Idiopathic hypotension    5. Dyslipidemia    6. HTN (hypertension), benign    7. Acute renal failure with acute tubular necrosis superimposed on stage 3 chronic kidney disease, unspecified whether stage 3a or 3b CKD        Plan:   Discussed diet , achieving and maintaining ideal body weight, and exercise.   We reviewed meds in detail.  Reassured-Discussed goals, options , plan  Co Q 10 200 mg per day  Metoprolol should be half twice  Omega-3 > 800 mg/d EPA/DHA  Refill NTG every 8-9 months  Michi was seen today for cardiomyopathy.    Diagnoses and all orders for this visit:    Ischemic cardiomyopathy    Coronary artery disease involving native coronary artery of native heart without angina pectoris    Stented coronary artery    Idiopathic hypotension    Dyslipidemia    HTN (hypertension), benign    Acute renal failure with acute tubular necrosis superimposed on stage 3 chronic kidney disease, unspecified whether stage 3a or 3b CKD    Other orders  -     pyridoxine, vitamin B6, (VITAMIN B-6) 100 MG Tab; Take 50 mg by mouth once daily.  -     cyanocobalamin (VITAMIN B-12) 1000 MCG tablet; Take 100 mcg by mouth once daily.  -     vitamin D (VITAMIN D3) 1000 units Tab; Take 1,000 Units by mouth once daily.  -     testosterone (ANDROGEL) 1 % (50 mg/5 gram) GlPk; Apply  topically once daily.            No follow-ups on file.

## 2020-10-14 NOTE — PATIENT INSTRUCTIONS
Discussed diet , achieving and maintaining ideal body weight, and exercise.   We reviewed meds in detail.  Reassured-Discussed goals, options , plan  Co Q 10 200 mg per day  Metoprolol should be half twice  Omega-3 > 800 mg/d EPA/DHA  Refill NTG every 8-9 months

## 2020-10-15 ENCOUNTER — LAB VISIT (OUTPATIENT)
Dept: LAB | Facility: OTHER | Age: 80
End: 2020-10-15
Attending: INTERNAL MEDICINE
Payer: MEDICARE

## 2020-10-15 DIAGNOSIS — I25.5 ISCHEMIC CARDIOMYOPATHY: ICD-10-CM

## 2020-10-15 DIAGNOSIS — I25.10 CORONARY ARTERY DISEASE, ANGINA PRESENCE UNSPECIFIED, UNSPECIFIED VESSEL OR LESION TYPE, UNSPECIFIED WHETHER NATIVE OR TRANSPLANTED HEART: ICD-10-CM

## 2020-10-15 LAB
ALBUMIN SERPL BCP-MCNC: 3.6 G/DL (ref 3.5–5.2)
ALP SERPL-CCNC: 47 U/L (ref 55–135)
ALT SERPL W/O P-5'-P-CCNC: 35 U/L (ref 10–44)
ANION GAP SERPL CALC-SCNC: 8 MMOL/L (ref 8–16)
AST SERPL-CCNC: 24 U/L (ref 10–40)
BILIRUB SERPL-MCNC: 1 MG/DL (ref 0.1–1)
BNP SERPL-MCNC: 158 PG/ML (ref 0–99)
BUN SERPL-MCNC: 28 MG/DL (ref 8–23)
CALCIUM SERPL-MCNC: 8.7 MG/DL (ref 8.7–10.5)
CHLORIDE SERPL-SCNC: 108 MMOL/L (ref 95–110)
CHOLEST SERPL-MCNC: 127 MG/DL (ref 120–199)
CHOLEST/HDLC SERPL: 2.8 {RATIO} (ref 2–5)
CO2 SERPL-SCNC: 24 MMOL/L (ref 23–29)
CREAT SERPL-MCNC: 1.4 MG/DL (ref 0.5–1.4)
EST. GFR  (AFRICAN AMERICAN): 54 ML/MIN/1.73 M^2
EST. GFR  (NON AFRICAN AMERICAN): 46 ML/MIN/1.73 M^2
GLUCOSE SERPL-MCNC: 146 MG/DL (ref 70–110)
HDLC SERPL-MCNC: 46 MG/DL (ref 40–75)
HDLC SERPL: 36.2 % (ref 20–50)
LDLC SERPL CALC-MCNC: 63 MG/DL (ref 63–159)
NONHDLC SERPL-MCNC: 81 MG/DL
POTASSIUM SERPL-SCNC: 4.7 MMOL/L (ref 3.5–5.1)
PROT SERPL-MCNC: 6.8 G/DL (ref 6–8.4)
SODIUM SERPL-SCNC: 140 MMOL/L (ref 136–145)
TRIGL SERPL-MCNC: 90 MG/DL (ref 30–150)
TSH SERPL DL<=0.005 MIU/L-ACNC: 1.31 UIU/ML (ref 0.4–4)

## 2020-10-15 PROCEDURE — 80053 COMPREHEN METABOLIC PANEL: CPT

## 2020-10-15 PROCEDURE — 36415 COLL VENOUS BLD VENIPUNCTURE: CPT

## 2020-10-15 PROCEDURE — 83880 ASSAY OF NATRIURETIC PEPTIDE: CPT

## 2020-10-15 PROCEDURE — 80061 LIPID PANEL: CPT

## 2020-10-15 PROCEDURE — 84443 ASSAY THYROID STIM HORMONE: CPT

## 2020-10-16 ENCOUNTER — PATIENT MESSAGE (OUTPATIENT)
Dept: CARDIOLOGY | Facility: CLINIC | Age: 80
End: 2020-10-16

## 2020-10-16 ENCOUNTER — HOSPITAL ENCOUNTER (OUTPATIENT)
Dept: CARDIOLOGY | Facility: HOSPITAL | Age: 80
Discharge: HOME | End: 2020-10-16
Attending: INTERNAL MEDICINE
Payer: MEDICARE

## 2020-10-16 VITALS — HEIGHT: 68 IN | BODY MASS INDEX: 24.86 KG/M2 | WEIGHT: 164 LBS

## 2020-10-16 DIAGNOSIS — I95.0 IDIOPATHIC HYPOTENSION: ICD-10-CM

## 2020-10-16 DIAGNOSIS — Z95.5 STENTED CORONARY ARTERY: ICD-10-CM

## 2020-10-16 DIAGNOSIS — I25.5 ISCHEMIC CARDIOMYOPATHY: ICD-10-CM

## 2020-10-16 DIAGNOSIS — I25.10 CORONARY ARTERY DISEASE INVOLVING NATIVE CORONARY ARTERY OF NATIVE HEART WITHOUT ANGINA PECTORIS: ICD-10-CM

## 2020-10-16 DIAGNOSIS — R55 NEAR SYNCOPE: ICD-10-CM

## 2020-10-16 LAB
CV STRESS BASE HR: 66 BPM
DIASTOLIC BLOOD PRESSURE: 76 MMHG
END DIASTOLIC INDEX-HIGH: 170 ML/M2
END SYSTOLIC INDEX-HIGH: 70 ML/M2
NUC REST DIASTOLIC VOLUME INDEX: 139
NUC REST EJECTION FRACTION: 30
NUC REST SYSTOLIC VOLUME INDEX: 97
NUC STRESS DIASTOLIC VOLUME INDEX: 135
NUC STRESS EJECTION FRACTION: 38 %
NUC STRESS SYSTOLIC VOLUME INDEX: 83
OHS CV CPX 85 PERCENT MAX PREDICTED HEART RATE MALE: 117
OHS CV CPX MAX PREDICTED HEART RATE: 138
OHS CV CPX PATIENT IS FEMALE: 0
OHS CV CPX PATIENT IS MALE: 1
OHS CV CPX PEAK DIASTOLIC BLOOD PRESSURE: 56 MMHG
OHS CV CPX PEAK HEAR RATE: 93 BPM
OHS CV CPX PEAK RATE PRESSURE PRODUCT: NORMAL
OHS CV CPX PEAK SYSTOLIC BLOOD PRESSURE: 109 MMHG
OHS CV CPX PERCENT MAX PREDICTED HEART RATE ACHIEVED: 67
OHS CV CPX RATE PRESSURE PRODUCT PRESENTING: 9768
RETIRED EF AND QEF - SEE NOTES: 51 %
STRESS ST DEPRESSION: 1.5 MM
SUMMED DIFFERENCE: 0
SUMMED REST SCORE: 10
SUMMED STRESS SCORE: 10
SYSTOLIC BLOOD PRESSURE: 148 MMHG

## 2020-10-16 PROCEDURE — 93016 STRESS TEST WITH MYOCARDIAL PERFUSION (CUPID ONLY): ICD-10-PCS | Mod: ,,, | Performed by: INTERNAL MEDICINE

## 2020-10-16 PROCEDURE — 78452 STRESS TEST WITH MYOCARDIAL PERFUSION (CUPID ONLY): ICD-10-PCS | Mod: 26,,, | Performed by: INTERNAL MEDICINE

## 2020-10-16 PROCEDURE — 93016 CV STRESS TEST SUPVJ ONLY: CPT | Mod: ,,, | Performed by: INTERNAL MEDICINE

## 2020-10-16 PROCEDURE — 93018 STRESS TEST WITH MYOCARDIAL PERFUSION (CUPID ONLY): ICD-10-PCS | Mod: ,,, | Performed by: INTERNAL MEDICINE

## 2020-10-16 PROCEDURE — 93017 CV STRESS TEST TRACING ONLY: CPT

## 2020-10-16 PROCEDURE — A9502 TC99M TETROFOSMIN: HCPCS

## 2020-10-16 PROCEDURE — 93018 CV STRESS TEST I&R ONLY: CPT | Mod: ,,, | Performed by: INTERNAL MEDICINE

## 2020-10-16 PROCEDURE — 78452 HT MUSCLE IMAGE SPECT MULT: CPT | Mod: 26,,, | Performed by: INTERNAL MEDICINE

## 2020-10-19 ENCOUNTER — TELEPHONE (OUTPATIENT)
Dept: CARDIOLOGY | Facility: CLINIC | Age: 80
End: 2020-10-19

## 2020-10-19 DIAGNOSIS — R73.01 IMPAIRED FASTING GLUCOSE: Primary | ICD-10-CM

## 2020-10-20 ENCOUNTER — DOCUMENTATION ONLY (OUTPATIENT)
Dept: CARDIOLOGY | Facility: CLINIC | Age: 80
End: 2020-10-20

## 2020-10-20 ENCOUNTER — LAB VISIT (OUTPATIENT)
Dept: LAB | Facility: HOSPITAL | Age: 80
End: 2020-10-20
Attending: INTERNAL MEDICINE
Payer: MEDICARE

## 2020-10-20 ENCOUNTER — OFFICE VISIT (OUTPATIENT)
Dept: CARDIOLOGY | Facility: CLINIC | Age: 80
End: 2020-10-20
Payer: MEDICARE

## 2020-10-20 ENCOUNTER — LAB VISIT (OUTPATIENT)
Dept: SURGERY | Facility: CLINIC | Age: 80
End: 2020-10-20
Payer: MEDICARE

## 2020-10-20 VITALS
HEIGHT: 67 IN | SYSTOLIC BLOOD PRESSURE: 126 MMHG | WEIGHT: 162.25 LBS | OXYGEN SATURATION: 97 % | BODY MASS INDEX: 25.47 KG/M2 | HEART RATE: 73 BPM | DIASTOLIC BLOOD PRESSURE: 64 MMHG

## 2020-10-20 DIAGNOSIS — R94.39 ABNORMAL CARDIOVASCULAR STRESS TEST: ICD-10-CM

## 2020-10-20 DIAGNOSIS — I25.5 ISCHEMIC CARDIOMYOPATHY: ICD-10-CM

## 2020-10-20 DIAGNOSIS — N18.31 STAGE 3A CHRONIC KIDNEY DISEASE: ICD-10-CM

## 2020-10-20 DIAGNOSIS — R73.01 IMPAIRED FASTING GLUCOSE: ICD-10-CM

## 2020-10-20 DIAGNOSIS — Z01.818 PREOP TESTING: ICD-10-CM

## 2020-10-20 DIAGNOSIS — Z95.5 STENTED CORONARY ARTERY: ICD-10-CM

## 2020-10-20 DIAGNOSIS — I10 HTN (HYPERTENSION), BENIGN: ICD-10-CM

## 2020-10-20 DIAGNOSIS — E78.5 DYSLIPIDEMIA: ICD-10-CM

## 2020-10-20 DIAGNOSIS — I25.5 CARDIOMYOPATHY, ISCHEMIC: ICD-10-CM

## 2020-10-20 DIAGNOSIS — R94.39 ABNORMAL CARDIOVASCULAR STRESS TEST: Primary | ICD-10-CM

## 2020-10-20 DIAGNOSIS — Z01.818 PREOP TESTING: Primary | ICD-10-CM

## 2020-10-20 DIAGNOSIS — I25.10 CORONARY ARTERY DISEASE INVOLVING NATIVE CORONARY ARTERY OF NATIVE HEART WITHOUT ANGINA PECTORIS: ICD-10-CM

## 2020-10-20 LAB
ABO + RH BLD: NORMAL
ANION GAP SERPL CALC-SCNC: 7 MMOL/L (ref 8–16)
BASOPHILS # BLD AUTO: 0.03 K/UL (ref 0–0.2)
BASOPHILS NFR BLD: 0.5 % (ref 0–1.9)
BLD GP AB SCN CELLS X3 SERPL QL: NORMAL
BUN SERPL-MCNC: 33 MG/DL (ref 8–23)
CALCIUM SERPL-MCNC: 9.3 MG/DL (ref 8.7–10.5)
CHLORIDE SERPL-SCNC: 107 MMOL/L (ref 95–110)
CO2 SERPL-SCNC: 24 MMOL/L (ref 23–29)
CREAT SERPL-MCNC: 1.4 MG/DL (ref 0.5–1.4)
DIFFERENTIAL METHOD: ABNORMAL
EOSINOPHIL # BLD AUTO: 0.1 K/UL (ref 0–0.5)
EOSINOPHIL NFR BLD: 2.2 % (ref 0–8)
ERYTHROCYTE [DISTWIDTH] IN BLOOD BY AUTOMATED COUNT: 13 % (ref 11.5–14.5)
EST. GFR  (AFRICAN AMERICAN): 53.7 ML/MIN/1.73 M^2
EST. GFR  (NON AFRICAN AMERICAN): 46.5 ML/MIN/1.73 M^2
ESTIMATED AVG GLUCOSE: 148 MG/DL (ref 68–131)
GLUCOSE SERPL-MCNC: 126 MG/DL (ref 70–110)
HBA1C MFR BLD HPLC: 6.8 % (ref 4–5.6)
HCT VFR BLD AUTO: 41.9 % (ref 40–54)
HGB BLD-MCNC: 13.4 G/DL (ref 14–18)
IMM GRANULOCYTES # BLD AUTO: 0.01 K/UL (ref 0–0.04)
IMM GRANULOCYTES NFR BLD AUTO: 0.2 % (ref 0–0.5)
LYMPHOCYTES # BLD AUTO: 1.6 K/UL (ref 1–4.8)
LYMPHOCYTES NFR BLD: 25.9 % (ref 18–48)
MCH RBC QN AUTO: 30.6 PG (ref 27–31)
MCHC RBC AUTO-ENTMCNC: 32 G/DL (ref 32–36)
MCV RBC AUTO: 96 FL (ref 82–98)
MONOCYTES # BLD AUTO: 0.7 K/UL (ref 0.3–1)
MONOCYTES NFR BLD: 11.3 % (ref 4–15)
NEUTROPHILS # BLD AUTO: 3.8 K/UL (ref 1.8–7.7)
NEUTROPHILS NFR BLD: 59.9 % (ref 38–73)
NRBC BLD-RTO: 0 /100 WBC
PLATELET # BLD AUTO: 151 K/UL (ref 150–350)
PMV BLD AUTO: 10.3 FL (ref 9.2–12.9)
POTASSIUM SERPL-SCNC: 4.6 MMOL/L (ref 3.5–5.1)
RBC # BLD AUTO: 4.38 M/UL (ref 4.6–6.2)
SARS-COV-2 RNA RESP QL NAA+PROBE: NOT DETECTED
SODIUM SERPL-SCNC: 138 MMOL/L (ref 136–145)
WBC # BLD AUTO: 6.29 K/UL (ref 3.9–12.7)

## 2020-10-20 PROCEDURE — 99999 PR PBB SHADOW E&M-EST. PATIENT-LVL III: ICD-10-PCS | Mod: PBBFAC,,, | Performed by: INTERNAL MEDICINE

## 2020-10-20 PROCEDURE — 99213 OFFICE O/P EST LOW 20 MIN: CPT | Mod: PBBFAC,25 | Performed by: INTERNAL MEDICINE

## 2020-10-20 PROCEDURE — 80048 BASIC METABOLIC PNL TOTAL CA: CPT

## 2020-10-20 PROCEDURE — 99205 OFFICE O/P NEW HI 60 MIN: CPT | Mod: S$PBB,,, | Performed by: INTERNAL MEDICINE

## 2020-10-20 PROCEDURE — 86901 BLOOD TYPING SEROLOGIC RH(D): CPT

## 2020-10-20 PROCEDURE — 83036 HEMOGLOBIN GLYCOSYLATED A1C: CPT

## 2020-10-20 PROCEDURE — U0003 INFECTIOUS AGENT DETECTION BY NUCLEIC ACID (DNA OR RNA); SEVERE ACUTE RESPIRATORY SYNDROME CORONAVIRUS 2 (SARS-COV-2) (CORONAVIRUS DISEASE [COVID-19]), AMPLIFIED PROBE TECHNIQUE, MAKING USE OF HIGH THROUGHPUT TECHNOLOGIES AS DESCRIBED BY CMS-2020-01-R: HCPCS

## 2020-10-20 PROCEDURE — 85025 COMPLETE CBC W/AUTO DIFF WBC: CPT

## 2020-10-20 PROCEDURE — 36415 COLL VENOUS BLD VENIPUNCTURE: CPT

## 2020-10-20 PROCEDURE — 99999 PR PBB SHADOW E&M-EST. PATIENT-LVL III: CPT | Mod: PBBFAC,,, | Performed by: INTERNAL MEDICINE

## 2020-10-20 PROCEDURE — 99205 PR OFFICE/OUTPT VISIT, NEW, LEVL V, 60-74 MIN: ICD-10-PCS | Mod: S$PBB,,, | Performed by: INTERNAL MEDICINE

## 2020-10-20 RX ORDER — UBIDECARENONE 30 MG
30 CAPSULE ORAL DAILY
COMMUNITY

## 2020-10-20 RX ORDER — SODIUM CHLORIDE 9 MG/ML
3 INJECTION, SOLUTION INTRAVENOUS CONTINUOUS
Status: CANCELLED | OUTPATIENT
Start: 2020-10-20 | End: 2020-10-20

## 2020-10-20 RX ORDER — FOLIC ACID 1 MG/1
1 TABLET ORAL DAILY
COMMUNITY

## 2020-10-20 RX ORDER — DIPHENHYDRAMINE HCL 25 MG
50 CAPSULE ORAL ONCE
Status: CANCELLED | OUTPATIENT
Start: 2020-10-20 | End: 2020-10-20

## 2020-10-20 RX ORDER — AMOXICILLIN 500 MG
CAPSULE ORAL DAILY
COMMUNITY

## 2020-10-20 NOTE — PROGRESS NOTES
OUTPATIENT CATHETERIZATION INSTRUCTIONS    You have been scheduled for a procedure in the catheterization lab on Friday, October 23, 2020.     Please report to the Cardiology Waiting Area on the Third floor of the hospital and check in at 6 AM.   You will then be taken to the SSCU (Short Stay Cardiac Unit) and prepared for your procedure. Please be aware that this is not the time of your procedure but the time you are to arrive. The procedures are scheduled on an hourly basis; however, emergency cases take precedence over all other cases.       You may not have anything to eat or drink after midnight the night before your test. You may take your regular morning medications with water. If there are any medications that you should not take you will be instructed to hold them that morning. If you are diabetic and on Metformin (Glucophage) do not take it the day before, the day of, and for 2 days after your procedure.      The procedure will take 1-2 hours to perform. After the procedure, you will return to SSCU on the third floor of the hospital. You will need to lie still (or keep your arm still) for the next 4 to 6 hours to minimize bleeding from the puncture site. Your family may remain in the room with you during this time.       You may be able to be discharged home that same afternoon if there is someone to drive you home and there were no complications. If you have one of the balloon, stent, or device procedures you may spend the night in the hospital. Your doctor will determine, based on your progress, the date and time of your discharge. The results of your procedure will be discussed with you before you are discharged. Any further testing or procedures will be scheduled for you either before you leave or you will be called with these appointments.       If you should have any questions, concerns, or need to change the date of your procedure, please call  CLARISSE [facility] @ [phone]    Special  Instructions:  Drink plenty of water the day before and after your procedure.           THE ABOVE INSTRUCTIONS WERE GIVEN TO THE PATIENT VERBALLY AND THEY VERBALIZED UNDERSTANDING.  THEY DO NOT REQUIRE ANY SPECIAL NEEDS AND DO NOT HAVE ANY LEARNING BARRIERS.          Directions for Reporting to Cardiology Waiting Area in the Hospital  If you park in the Parking Garage:  Take elevators to the1st floor of the parking garage.  Continue past the gift shop, coffee shop, and piano.  Take a right and go to the gold elevators. (Elevator B)  Take the elevator to the 3rd floor.  Follow the arrow on the sign on the wall that says Cath Lab Registration/EP Lab Registration.  Follow the long hallway all the way around until you come to a big open area.  This is the registration area.  Check in at Reception Desk.    OR    If family is dropping you off:  Have them drop you off at the front of the Hospital under the green overhang.  Enter through the doors and take a right.  Take the E elevators to the 3rd floor Cardiology Waiting Area.  Check in at the Reception Desk in the waiting room.

## 2020-10-21 PROBLEM — N18.31 STAGE 3A CHRONIC KIDNEY DISEASE: Status: ACTIVE | Noted: 2020-10-21

## 2020-10-21 NOTE — H&P (VIEW-ONLY)
Subjective:    Patient ID:  Michi Mac is a 80 y.o. male who presents for evaluation of Cardiomyopathy    Referring cardiologist: Dr. SALLIE Mishra & Dr. Miguelito LOZANO  Mr. Mac is an 79 y/o gentleman who is visiting his son from [place], [place].  The patient reports a h/o CAD s/p MI in 1976 and s/p ENEDINA to the LCx and PB in 2004.  He has a h/o HLD and HTN.  The patient denies chest pain.  He and his wife report that 10 days ago he experienced a syncopal near syncopal event followed by confusion. The patient's wife reports that the patient has had 4 similar events during the last 6-7 years. However she states these were on hot days when the patient was dehydrated.  The most recent episode was not on a hot day and not associated with being dehyrdated. The patient does not recall and prodrome.  He denies palpitations.  He denies dyspnea on exertion.  He does not experience LE edema, orthopnea, or PND.      Past Medical History:   Diagnosis Date    Hyperlipidemia     Hypertension     Prediabetes      History reviewed. No pertinent surgical history.  Current Outpatient Medications on File Prior to Visit   Medication Sig Dispense Refill    aspirin 325 MG tablet Take 325 mg by mouth once daily.      atorvastatin (LIPITOR) 40 MG tablet Take 40 mg by mouth once daily.      buPROPion (WELLBUTRIN XL) 150 MG TB24 tablet Take 150 mg by mouth once daily.      clopidogreL (PLAVIX) 75 mg tablet Take 75 mg by mouth once daily.      co-enzyme Q-10 30 mg capsule Take 30 mg by mouth once daily.      cyanocobalamin (VITAMIN B-12) 1000 MCG tablet Take 100 mcg by mouth once daily.      escitalopram oxalate (LEXAPRO) 20 MG tablet Take 20 mg by mouth once daily.      ezetimibe (ZETIA) 10 mg tablet Take 10 mg by mouth once daily.      folic acid (FOLVITE) 1 MG tablet Take 1 mg by mouth once daily.      losartan (COZAAR) 25 MG tablet Take 25 mg by mouth once daily.      metoprolol tartrate (LOPRESSOR) 25 MG tablet Take 25 mg by mouth  2 (two) times daily. 1/2 tab daily      omega-3 fatty acids/fish oil (FISH OIL-OMEGA-3 FATTY ACIDS) 300-1,000 mg capsule Take by mouth once daily.      oxybutynin (DITROPAN) 5 MG Tab Take 5 mg by mouth once daily.       pyridoxine, vitamin B6, (VITAMIN B-6) 100 MG Tab Take 50 mg by mouth once daily.      testosterone (ANDROGEL) 1 % (50 mg/5 gram) GlPk Apply topically once daily.      vitamin D (VITAMIN D3) 1000 units Tab Take 1,000 Units by mouth once daily.      nitroGLYCERIN (NITROSTAT) 0.4 MG SL tablet Place 1 tablet (0.4 mg total) under the tongue every 5 (five) minutes as needed for Chest pain. (Patient not taking: Reported on 10/20/2020) 25 tablet 3     No current facility-administered medications on file prior to visit.      Review of patient's allergies indicates:   Allergen Reactions    Codeine      Social History     Tobacco Use    Smoking status: Former Smoker     Years: 45.00     Types: Cigarettes     Quit date: 10/20/1976     Years since quittin.0    Smokeless tobacco: Never Used   Substance Use Topics    Alcohol use: Yes     Alcohol/week: 1.0 standard drinks     Types: 1 Glasses of wine per week    Drug use: Never     Family History   Problem Relation Age of Onset    Heart disease Sister     Heart disease Brother        Review of Systems   Constitution: Negative for decreased appetite, diaphoresis, fever, malaise/fatigue, weight gain and weight loss.   HENT: Negative for congestion, nosebleeds and sore throat.    Eyes: Negative for blurred vision, vision loss in left eye, vision loss in right eye and visual disturbance.   Cardiovascular: Positive for near-syncope. Negative for chest pain, claudication, dyspnea on exertion, leg swelling, orthopnea, palpitations, paroxysmal nocturnal dyspnea and syncope.   Respiratory: Negative for cough, hemoptysis, shortness of breath and wheezing.    Endocrine: Negative for polyuria.   Hematologic/Lymphatic: Does not bruise/bleed easily.   Skin:  "Negative for nail changes and rash.   Musculoskeletal: Negative for back pain, muscle cramps and myalgias.   Gastrointestinal: Negative for abdominal pain, change in bowel habit, diarrhea, heartburn, hematemesis, hematochezia, melena, nausea and vomiting.   Genitourinary: Negative for bladder incontinence, dysuria, frequency and hematuria.   Psychiatric/Behavioral: Negative for depression.   Allergic/Immunologic: Negative for hives.        Objective:  Vitals:    10/20/20 1315 10/20/20 1317   BP: 134/63 126/64   BP Location: Left arm Right arm   Patient Position: Sitting Sitting   BP Method: Large (Automatic) Large (Automatic)   Pulse: 73 73   SpO2: 97%    Weight: 73.6 kg (162 lb 4.1 oz)    Height: 5' 6.54" (1.69 m)          Physical Exam   Constitutional: He is oriented to person, place, and time. He appears well-developed and well-nourished.   HENT:   Head: Normocephalic and atraumatic.   Eyes: Pupils are equal, round, and reactive to light. EOM are normal.   Neck: Neck supple. No JVD present. No thyromegaly present.   Cardiovascular: Normal rate and regular rhythm. PMI is displaced. Exam reveals no gallop and no friction rub.   No murmur heard.  Pulses:       Carotid pulses are 2+ on the right side and 2+ on the left side.       Radial pulses are 2+ on the right side and 2+ on the left side.        Femoral pulses are 2+ on the right side and 2+ on the left side.       Dorsalis pedis pulses are 2+ on the right side and 2+ on the left side.        Posterior tibial pulses are 2+ on the right side and 2+ on the left side.   Normal right radial Butch's test.   Pulmonary/Chest: Effort normal. He has no wheezes. He has no rhonchi. He has no rales.   Abdominal: Soft. Normal appearance. He exhibits no distension. There is no hepatosplenomegaly. There is no abdominal tenderness.   Neurological: He is alert and oriented to person, place, and time. Gait normal.   Psychiatric: He has a normal mood and affect.       "   Assessment:       1. Abnormal cardiovascular stress test    2. Cardiomyopathy, ischemic    3. Coronary artery disease involving native coronary artery of native heart without angina pectoris    4. Dyslipidemia    5. HTN (hypertension), benign    6. Ischemic cardiomyopathy    7. Stented coronary artery    8. Stage 3a chronic kidney disease         Plan:       1) Cardiomyopathy.  The patient denies CHF symptoms and appears euvolemic on exam today.  I reviewed the patient's TTE from 10/13/20 which demonstrated an LVEF=38%. The patient provided a report that stated his LVEF was normal 4 years ago. I also reviewed his nuclear stress test from 10/16/20 which is concerning for possible myocardial ischemia.  The etiology of the patient's syncope is not known.  However in the setting of cardiomyopathy ventricular arrhythmia is on the differential diagnosis. The patient has a h/o cardiomyopathy and the decrease in his LVEF is concerning for progression of his coronary artery disease.  I discussed the differential diagnosis of his drop in LVEF with the patient.  I discussed cardiac catheterization and possible PCI with the patient and his wife.  The patient would like to proceed.  -continue enteric-coated aspirin 325 mg p.o. q.day  -continue Plavix 75 mg p.o. q.day  -continue Lopressor 25 mg p.o. q.day  -6Fr right radial access  The risks, benefits, and alternatives of cardiac catheterization and possible intervention were discussed with the patient.  All questions were answered and informed consent was obtained.    2) dyslipidemia.  10/15/2020 lipid profile reviewed;   -continue Lipitor 40 mg p.o. q.day  -continue Zetia 10 mg p.o. q.day  -continue omega-3 supplement  -the patient reports he eats a heart healthy diet for which was commended    3) hypertension.  The patient's blood pressure appears adequately controlled in clinic today  -continue Cozaar 25 mg p.o. q.day    4) CKD 3.  The patient's creatinine today was 1.4  with an estimated GFR of 46.5.  Recommend oral hydration the day prior to cardiac catheterization.  Will provide IV hydration the day of cardiac catheterization.  If the patient has complex CAD then will likely stage PCI in order to limit the contrast dosing given setting    All the patient's and his wife's questions were answered.

## 2020-10-21 NOTE — PROGRESS NOTES
Subjective:    Patient ID:  Michi Mac is a 80 y.o. male who presents for evaluation of Cardiomyopathy    Referring cardiologist: Dr. SALLIE Mishra & Dr. Miguelito LOZANO  Mr. aMc is an 79 y/o gentleman who is visiting his son from [place], [place].  The patient reports a h/o CAD s/p MI in 1976 and s/p ENEDINA to the LCx and PB in 2004.  He has a h/o HLD and HTN.  The patient denies chest pain.  He and his wife report that 10 days ago he experienced a syncopal near syncopal event followed by confusion. The patient's wife reports that the patient has had 4 similar events during the last 6-7 years. However she states these were on hot days when the patient was dehydrated.  The most recent episode was not on a hot day and not associated with being dehyrdated. The patient does not recall and prodrome.  He denies palpitations.  He denies dyspnea on exertion.  He does not experience LE edema, orthopnea, or PND.      Past Medical History:   Diagnosis Date    Hyperlipidemia     Hypertension     Prediabetes      History reviewed. No pertinent surgical history.  Current Outpatient Medications on File Prior to Visit   Medication Sig Dispense Refill    aspirin 325 MG tablet Take 325 mg by mouth once daily.      atorvastatin (LIPITOR) 40 MG tablet Take 40 mg by mouth once daily.      buPROPion (WELLBUTRIN XL) 150 MG TB24 tablet Take 150 mg by mouth once daily.      clopidogreL (PLAVIX) 75 mg tablet Take 75 mg by mouth once daily.      co-enzyme Q-10 30 mg capsule Take 30 mg by mouth once daily.      cyanocobalamin (VITAMIN B-12) 1000 MCG tablet Take 100 mcg by mouth once daily.      escitalopram oxalate (LEXAPRO) 20 MG tablet Take 20 mg by mouth once daily.      ezetimibe (ZETIA) 10 mg tablet Take 10 mg by mouth once daily.      folic acid (FOLVITE) 1 MG tablet Take 1 mg by mouth once daily.      losartan (COZAAR) 25 MG tablet Take 25 mg by mouth once daily.      metoprolol tartrate (LOPRESSOR) 25 MG tablet Take 25 mg by mouth  2 (two) times daily. 1/2 tab daily      omega-3 fatty acids/fish oil (FISH OIL-OMEGA-3 FATTY ACIDS) 300-1,000 mg capsule Take by mouth once daily.      oxybutynin (DITROPAN) 5 MG Tab Take 5 mg by mouth once daily.       pyridoxine, vitamin B6, (VITAMIN B-6) 100 MG Tab Take 50 mg by mouth once daily.      testosterone (ANDROGEL) 1 % (50 mg/5 gram) GlPk Apply topically once daily.      vitamin D (VITAMIN D3) 1000 units Tab Take 1,000 Units by mouth once daily.      nitroGLYCERIN (NITROSTAT) 0.4 MG SL tablet Place 1 tablet (0.4 mg total) under the tongue every 5 (five) minutes as needed for Chest pain. (Patient not taking: Reported on 10/20/2020) 25 tablet 3     No current facility-administered medications on file prior to visit.      Review of patient's allergies indicates:   Allergen Reactions    Codeine      Social History     Tobacco Use    Smoking status: Former Smoker     Years: 45.00     Types: Cigarettes     Quit date: 10/20/1976     Years since quittin.0    Smokeless tobacco: Never Used   Substance Use Topics    Alcohol use: Yes     Alcohol/week: 1.0 standard drinks     Types: 1 Glasses of wine per week    Drug use: Never     Family History   Problem Relation Age of Onset    Heart disease Sister     Heart disease Brother        Review of Systems   Constitution: Negative for decreased appetite, diaphoresis, fever, malaise/fatigue, weight gain and weight loss.   HENT: Negative for congestion, nosebleeds and sore throat.    Eyes: Negative for blurred vision, vision loss in left eye, vision loss in right eye and visual disturbance.   Cardiovascular: Positive for near-syncope. Negative for chest pain, claudication, dyspnea on exertion, leg swelling, orthopnea, palpitations, paroxysmal nocturnal dyspnea and syncope.   Respiratory: Negative for cough, hemoptysis, shortness of breath and wheezing.    Endocrine: Negative for polyuria.   Hematologic/Lymphatic: Does not bruise/bleed easily.   Skin:  "Negative for nail changes and rash.   Musculoskeletal: Negative for back pain, muscle cramps and myalgias.   Gastrointestinal: Negative for abdominal pain, change in bowel habit, diarrhea, heartburn, hematemesis, hematochezia, melena, nausea and vomiting.   Genitourinary: Negative for bladder incontinence, dysuria, frequency and hematuria.   Psychiatric/Behavioral: Negative for depression.   Allergic/Immunologic: Negative for hives.        Objective:  Vitals:    10/20/20 1315 10/20/20 1317   BP: 134/63 126/64   BP Location: Left arm Right arm   Patient Position: Sitting Sitting   BP Method: Large (Automatic) Large (Automatic)   Pulse: 73 73   SpO2: 97%    Weight: 73.6 kg (162 lb 4.1 oz)    Height: 5' 6.54" (1.69 m)          Physical Exam   Constitutional: He is oriented to person, place, and time. He appears well-developed and well-nourished.   HENT:   Head: Normocephalic and atraumatic.   Eyes: Pupils are equal, round, and reactive to light. EOM are normal.   Neck: Neck supple. No JVD present. No thyromegaly present.   Cardiovascular: Normal rate and regular rhythm. PMI is displaced. Exam reveals no gallop and no friction rub.   No murmur heard.  Pulses:       Carotid pulses are 2+ on the right side and 2+ on the left side.       Radial pulses are 2+ on the right side and 2+ on the left side.        Femoral pulses are 2+ on the right side and 2+ on the left side.       Dorsalis pedis pulses are 2+ on the right side and 2+ on the left side.        Posterior tibial pulses are 2+ on the right side and 2+ on the left side.   Normal right radial Butch's test.   Pulmonary/Chest: Effort normal. He has no wheezes. He has no rhonchi. He has no rales.   Abdominal: Soft. Normal appearance. He exhibits no distension. There is no hepatosplenomegaly. There is no abdominal tenderness.   Neurological: He is alert and oriented to person, place, and time. Gait normal.   Psychiatric: He has a normal mood and affect.       "   Assessment:       1. Abnormal cardiovascular stress test    2. Cardiomyopathy, ischemic    3. Coronary artery disease involving native coronary artery of native heart without angina pectoris    4. Dyslipidemia    5. HTN (hypertension), benign    6. Ischemic cardiomyopathy    7. Stented coronary artery    8. Stage 3a chronic kidney disease         Plan:       1) Cardiomyopathy.  The patient denies CHF symptoms and appears euvolemic on exam today.  I reviewed the patient's TTE from 10/13/20 which demonstrated an LVEF=38%. The patient provided a report that stated his LVEF was normal 4 years ago. I also reviewed his nuclear stress test from 10/16/20 which is concerning for possible myocardial ischemia.  The etiology of the patient's syncope is not known.  However in the setting of cardiomyopathy ventricular arrhythmia is on the differential diagnosis. The patient has a h/o cardiomyopathy and the decrease in his LVEF is concerning for progression of his coronary artery disease.  I discussed the differential diagnosis of his drop in LVEF with the patient.  I discussed cardiac catheterization and possible PCI with the patient and his wife.  The patient would like to proceed.  -continue enteric-coated aspirin 325 mg p.o. q.day  -continue Plavix 75 mg p.o. q.day  -continue Lopressor 25 mg p.o. q.day  -6Fr right radial access  The risks, benefits, and alternatives of cardiac catheterization and possible intervention were discussed with the patient.  All questions were answered and informed consent was obtained.    2) dyslipidemia.  10/15/2020 lipid profile reviewed;   -continue Lipitor 40 mg p.o. q.day  -continue Zetia 10 mg p.o. q.day  -continue omega-3 supplement  -the patient reports he eats a heart healthy diet for which was commended    3) hypertension.  The patient's blood pressure appears adequately controlled in clinic today  -continue Cozaar 25 mg p.o. q.day    4) CKD 3.  The patient's creatinine today was 1.4  with an estimated GFR of 46.5.  Recommend oral hydration the day prior to cardiac catheterization.  Will provide IV hydration the day of cardiac catheterization.  If the patient has complex CAD then will likely stage PCI in order to limit the contrast dosing given setting    All the patient's and his wife's questions were answered.

## 2020-10-23 ENCOUNTER — HOSPITAL ENCOUNTER (OUTPATIENT)
Facility: HOSPITAL | Age: 80
Discharge: HOME | End: 2020-10-23
Attending: INTERNAL MEDICINE | Admitting: INTERNAL MEDICINE
Payer: MEDICARE

## 2020-10-23 VITALS
TEMPERATURE: 97 F | WEIGHT: 160 LBS | SYSTOLIC BLOOD PRESSURE: 136 MMHG | BODY MASS INDEX: 25.11 KG/M2 | HEART RATE: 65 BPM | OXYGEN SATURATION: 97 % | DIASTOLIC BLOOD PRESSURE: 68 MMHG | RESPIRATION RATE: 18 BRPM | HEIGHT: 67 IN

## 2020-10-23 DIAGNOSIS — I25.10 CORONARY ARTERY DISEASE INVOLVING NATIVE CORONARY ARTERY OF NATIVE HEART WITHOUT ANGINA PECTORIS: Primary | ICD-10-CM

## 2020-10-23 DIAGNOSIS — Z01.818 PRE-OP EXAM: ICD-10-CM

## 2020-10-23 DIAGNOSIS — R94.39 ABNORMAL CARDIOVASCULAR STRESS TEST: ICD-10-CM

## 2020-10-23 DIAGNOSIS — I25.5 CARDIOMYOPATHY, ISCHEMIC: ICD-10-CM

## 2020-10-23 LAB
ABO + RH BLD: NORMAL
ANION GAP SERPL CALC-SCNC: 7 MMOL/L (ref 8–16)
BASOPHILS # BLD AUTO: 0.03 K/UL (ref 0–0.2)
BASOPHILS NFR BLD: 0.6 % (ref 0–1.9)
BLD GP AB SCN CELLS X3 SERPL QL: NORMAL
BUN SERPL-MCNC: 35 MG/DL (ref 8–23)
CALCIUM SERPL-MCNC: 8.5 MG/DL (ref 8.7–10.5)
CHLORIDE SERPL-SCNC: 107 MMOL/L (ref 95–110)
CO2 SERPL-SCNC: 23 MMOL/L (ref 23–29)
CREAT SERPL-MCNC: 1.5 MG/DL (ref 0.5–1.4)
DIFFERENTIAL METHOD: ABNORMAL
EOSINOPHIL # BLD AUTO: 0.1 K/UL (ref 0–0.5)
EOSINOPHIL NFR BLD: 2.7 % (ref 0–8)
ERYTHROCYTE [DISTWIDTH] IN BLOOD BY AUTOMATED COUNT: 13.1 % (ref 11.5–14.5)
EST. GFR  (AFRICAN AMERICAN): 49.4 ML/MIN/1.73 M^2
EST. GFR  (NON AFRICAN AMERICAN): 42.7 ML/MIN/1.73 M^2
GLUCOSE SERPL-MCNC: 125 MG/DL (ref 70–110)
HCT VFR BLD AUTO: 38.7 % (ref 40–54)
HGB BLD-MCNC: 12.6 G/DL (ref 14–18)
IMM GRANULOCYTES # BLD AUTO: 0.01 K/UL (ref 0–0.04)
IMM GRANULOCYTES NFR BLD AUTO: 0.2 % (ref 0–0.5)
LYMPHOCYTES # BLD AUTO: 1.6 K/UL (ref 1–4.8)
LYMPHOCYTES NFR BLD: 29.8 % (ref 18–48)
MCH RBC QN AUTO: 30.4 PG (ref 27–31)
MCHC RBC AUTO-ENTMCNC: 32.6 G/DL (ref 32–36)
MCV RBC AUTO: 93 FL (ref 82–98)
MONOCYTES # BLD AUTO: 0.7 K/UL (ref 0.3–1)
MONOCYTES NFR BLD: 13.1 % (ref 4–15)
NEUTROPHILS # BLD AUTO: 2.8 K/UL (ref 1.8–7.7)
NEUTROPHILS NFR BLD: 53.6 % (ref 38–73)
NRBC BLD-RTO: 0 /100 WBC
PLATELET # BLD AUTO: 145 K/UL (ref 150–350)
PMV BLD AUTO: 10.1 FL (ref 9.2–12.9)
POTASSIUM SERPL-SCNC: 4.5 MMOL/L (ref 3.5–5.1)
RBC # BLD AUTO: 4.15 M/UL (ref 4.6–6.2)
SODIUM SERPL-SCNC: 137 MMOL/L (ref 136–145)
WBC # BLD AUTO: 5.21 K/UL (ref 3.9–12.7)

## 2020-10-23 PROCEDURE — 93458 L HRT ARTERY/VENTRICLE ANGIO: CPT | Performed by: INTERNAL MEDICINE

## 2020-10-23 PROCEDURE — C1894 INTRO/SHEATH, NON-LASER: HCPCS | Performed by: INTERNAL MEDICINE

## 2020-10-23 PROCEDURE — 86901 BLOOD TYPING SEROLOGIC RH(D): CPT

## 2020-10-23 PROCEDURE — 93458 PR CATH PLACE/CORON ANGIO, IMG SUPER/INTERP,W LEFT HEART VENTRICULOGRAPHY: ICD-10-PCS | Mod: 26,GC,, | Performed by: INTERNAL MEDICINE

## 2020-10-23 PROCEDURE — 99152 MOD SED SAME PHYS/QHP 5/>YRS: CPT | Mod: GC,,, | Performed by: INTERNAL MEDICINE

## 2020-10-23 PROCEDURE — 80048 BASIC METABOLIC PNL TOTAL CA: CPT

## 2020-10-23 PROCEDURE — 25000003 PHARM REV CODE 250: Performed by: STUDENT IN AN ORGANIZED HEALTH CARE EDUCATION/TRAINING PROGRAM

## 2020-10-23 PROCEDURE — 25000003 PHARM REV CODE 250: Performed by: INTERNAL MEDICINE

## 2020-10-23 PROCEDURE — 36415 COLL VENOUS BLD VENIPUNCTURE: CPT

## 2020-10-23 PROCEDURE — 93571 IV DOP VEL&/PRESS C FLO 1ST: CPT | Mod: 26,52,LM,GC | Performed by: INTERNAL MEDICINE

## 2020-10-23 PROCEDURE — 85347 COAGULATION TIME ACTIVATED: CPT | Performed by: INTERNAL MEDICINE

## 2020-10-23 PROCEDURE — 99153 MOD SED SAME PHYS/QHP EA: CPT | Performed by: INTERNAL MEDICINE

## 2020-10-23 PROCEDURE — 93458 L HRT ARTERY/VENTRICLE ANGIO: CPT | Mod: 26,GC,, | Performed by: INTERNAL MEDICINE

## 2020-10-23 PROCEDURE — 99152 MOD SED SAME PHYS/QHP 5/>YRS: CPT | Performed by: INTERNAL MEDICINE

## 2020-10-23 PROCEDURE — 99152 PR MOD CONSCIOUS SEDATION, SAME PHYS, 5+ YRS, FIRST 15 MIN: ICD-10-PCS | Mod: GC,,, | Performed by: INTERNAL MEDICINE

## 2020-10-23 PROCEDURE — C1769 GUIDE WIRE: HCPCS | Performed by: INTERNAL MEDICINE

## 2020-10-23 PROCEDURE — 93005 ELECTROCARDIOGRAM TRACING: CPT

## 2020-10-23 PROCEDURE — 93571 IV DOP VEL&/PRESS C FLO 1ST: CPT | Mod: 74,LM | Performed by: INTERNAL MEDICINE

## 2020-10-23 PROCEDURE — 93571 PR HEART FLOW RESERV MEASURE,INIT VESSL: ICD-10-PCS | Mod: 26,52,LM,GC | Performed by: INTERNAL MEDICINE

## 2020-10-23 PROCEDURE — 93010 EKG 12-LEAD: ICD-10-PCS | Mod: ,,, | Performed by: INTERNAL MEDICINE

## 2020-10-23 PROCEDURE — C1887 CATHETER, GUIDING: HCPCS | Performed by: INTERNAL MEDICINE

## 2020-10-23 PROCEDURE — 85025 COMPLETE CBC W/AUTO DIFF WBC: CPT

## 2020-10-23 PROCEDURE — 63600175 PHARM REV CODE 636 W HCPCS: Performed by: INTERNAL MEDICINE

## 2020-10-23 PROCEDURE — 93010 ELECTROCARDIOGRAM REPORT: CPT | Mod: ,,, | Performed by: INTERNAL MEDICINE

## 2020-10-23 PROCEDURE — 25500020 PHARM REV CODE 255: Performed by: INTERNAL MEDICINE

## 2020-10-23 RX ORDER — LIDOCAINE HYDROCHLORIDE 20 MG/ML
INJECTION, SOLUTION EPIDURAL; INFILTRATION; INTRACAUDAL; PERINEURAL
Status: DISCONTINUED | OUTPATIENT
Start: 2020-10-23 | End: 2020-10-23 | Stop reason: HOSPADM

## 2020-10-23 RX ORDER — HEPARIN SODIUM 200 [USP'U]/100ML
INJECTION, SOLUTION INTRAVENOUS
Status: DISCONTINUED | OUTPATIENT
Start: 2020-10-23 | End: 2020-10-23 | Stop reason: HOSPADM

## 2020-10-23 RX ORDER — NITROGLYCERIN 5 MG/ML
INJECTION, SOLUTION INTRAVENOUS
Status: DISCONTINUED | OUTPATIENT
Start: 2020-10-23 | End: 2020-10-23 | Stop reason: HOSPADM

## 2020-10-23 RX ORDER — HEPARIN SODIUM 1000 [USP'U]/ML
INJECTION, SOLUTION INTRAVENOUS; SUBCUTANEOUS
Status: DISCONTINUED | OUTPATIENT
Start: 2020-10-23 | End: 2020-10-23 | Stop reason: HOSPADM

## 2020-10-23 RX ORDER — DIPHENHYDRAMINE HCL 50 MG
50 CAPSULE ORAL ONCE
Status: COMPLETED | OUTPATIENT
Start: 2020-10-23 | End: 2020-10-23

## 2020-10-23 RX ORDER — SODIUM CHLORIDE 9 MG/ML
3 INJECTION, SOLUTION INTRAVENOUS CONTINUOUS
Status: ACTIVE | OUTPATIENT
Start: 2020-10-23 | End: 2020-10-23

## 2020-10-23 RX ORDER — FENTANYL CITRATE 50 UG/ML
INJECTION, SOLUTION INTRAMUSCULAR; INTRAVENOUS
Status: DISCONTINUED | OUTPATIENT
Start: 2020-10-23 | End: 2020-10-23 | Stop reason: HOSPADM

## 2020-10-23 RX ORDER — ASPIRIN 81 MG/1
81 TABLET ORAL ONCE
Status: COMPLETED | OUTPATIENT
Start: 2020-10-23 | End: 2020-10-23

## 2020-10-23 RX ORDER — MIDAZOLAM HYDROCHLORIDE 1 MG/ML
INJECTION, SOLUTION INTRAMUSCULAR; INTRAVENOUS
Status: DISCONTINUED | OUTPATIENT
Start: 2020-10-23 | End: 2020-10-23 | Stop reason: HOSPADM

## 2020-10-23 RX ORDER — CLOPIDOGREL BISULFATE 75 MG/1
75 TABLET ORAL ONCE
Status: COMPLETED | OUTPATIENT
Start: 2020-10-23 | End: 2020-10-23

## 2020-10-23 RX ADMIN — DIPHENHYDRAMINE HYDROCHLORIDE 50 MG: 50 CAPSULE ORAL at 07:10

## 2020-10-23 RX ADMIN — ASPIRIN 81 MG: 81 TABLET, COATED ORAL at 07:10

## 2020-10-23 RX ADMIN — SODIUM CHLORIDE 3 ML/KG/HR: 0.9 INJECTION, SOLUTION INTRAVENOUS at 07:10

## 2020-10-23 RX ADMIN — CLOPIDOGREL 75 MG: 75 TABLET, FILM COATED ORAL at 07:10

## 2020-10-23 NOTE — OP NOTE
Post Op Note    Indication: Syncope    Access: Right radial artery     Findings:  of the LAD (filled by right to left collaterals), distal LM with 50% stenosis (iFR 0.97), 40% stenosis of the mid RCA, 60% stenosis of the ostial LCx, ISR of the OM1 (with 80% stenosis) and patent stent of the PLB. LVEDP 25 mmHg.    Closure: Vasc Band    Plan:   -post cath orders in place  -c/w medical management (DAPT, statin, beta-blocker and ARB therapy)   -recommend cardiac viability study     Angela Cope, PGY-6 (Cardiology Fellow)

## 2020-10-23 NOTE — DISCHARGE INSTRUCTIONS
A cardiac viability study has been ordered. Please go for this test. You will be notified with results of the test when completed.     ___________________________________________________________________________________________________      Discharge Instructions and Care of Your Wrist After a Cardiac Catheterization Procedure Performed via the Radial Artery    For 1 week following the procedure:  Do not subject your affected hand/arm to any forceful movements (i.e. supporting weight when rising from a chair or bed)  Avoid excessive (extension/flexion) wrist movement (i.e. supporting weight when rising from a chair or bed, push-ups, lifting garage doors, etc.)  Do not drive a car for 24 hours  The dressing on the puncture site may be removed after 24 hours and left open to air. If there is minor oozing, you may apply a Band-aid and remove after 12 hours  You may shower on the day following your procedure. Do not take a tub bath or submerge the puncture site in water for 3 days following the procedure  Do not lift anything heavier than 3 to 5 pounds with the affected hand/arm  Do not operate a lawnmower, motorcycle, chainsaw, or all-terrain vehicle   Do not engage in vigorous exercise (i.e. Tennis, Golf, Bowling) using the affected arm    If bleeding should occur following discharge:  Sit down and apply firm pressure to the puncture site with your fingers for 10 minutes   If the bleeding stops, continue to sit quietly, keeping your wrist straight for 2 hours. Notify your physician as soon as possible   If bleeding does not stop after 10 minutes or if there is a large amount of bleeding or spurting, call 911 immediately. Do not drive yourself to the hospital.     You should expect mild tingling in your hand and tenderness at the puncture site for up to 3 days.     Notify your physician if these symptoms persist or if you experience:  Change in color or temperature of the hand or arm  Redness, heat, or pus at the  puncture site  Chills or fever greater than 101.0 F

## 2020-10-23 NOTE — Clinical Note
62 ml injected throughout the case. 138 mL total wasted during the case. 200 mL total used in the case.

## 2020-10-23 NOTE — INTERVAL H&P NOTE
The patient has been examined and the H&P has been reviewed:    I concur with the findings and no changes have occurred since H&P was written.    Surgery risks, benefits and alternative options discussed and understood by patient/family.      I have personally taken the history and examined this patient and agree with the resident's note as stated above.  The patient presents for cardiac catheterization and possible PCI after he was found to have a cardiomyopathy with an EF of 35% by TTE and an abnormal stress test.  All of the patient's questions were answered.

## 2020-10-23 NOTE — PLAN OF CARE
Received report from CLARISSE Arriaga. Patient s/p [facility] with right radial artery access, AAOx3. VSS, no c/o pain or discomfort at this time, resp even and unlabored. vasc band dressing to right wrist is CDI. No active bleeding. No hematoma noted. Post procedure protocol reviewed with patient and patient's family. Understanding verbalized. Family members at bedside. Nurse call bell within reach. Will continue to monitor per post procedure protocol.

## 2020-10-23 NOTE — DISCHARGE SUMMARY
Discharge Summary  Interventional Cardiology      Admit Date: 10/23/2020    Discharge Date:  10/23/2020    Attending Physician: Sandoval Pozo MD    Discharge Physician: Angela Cope, PGY-6    Principal Diagnoses: Coronary Artery Disease [I25.10]     Indication for Admission: Left heart cath (Right), Fractional Flow [place] (FFR), Coronary    Discharged Condition: Good    Hospital Course:   Patient presented for outpatient coronary angiogram which went without complication. Coronary angiogram revealed  of the LAD with collaterals supplied from the right system, patent PLB stent, ISR of the OM1 stent (80% stenosed), 50% stenosis of the distal LM with iFR of 0.97.  See full cath report in Epic for details. Hemostasis of patient's R Radial access site was achieved with VascBand. Patient was monitored per post-cath protocol, and his R radial access site was c/d/i with no hematoma. Patient was able to ambulate without difficulty. He was feeling well and anticipating discharge home today.     Outpatient Plan:  - There were no medication changes   -Recommend cardiac viability study to guide future decisions with revascularization of the  LAD    Diet: Cardiac diet    Activity: Ad jake, wound care instructions provided    Disposition: Home or Self Care    Discharge Medications:      Medication List      CONTINUE taking these medications    aspirin 325 MG tablet     atorvastatin 40 MG tablet  Commonly known as: LIPITOR     buPROPion 150 MG TB24 tablet  Commonly known as: WELLBUTRIN XL     clopidogreL 75 mg tablet  Commonly known as: PLAVIX     co-enzyme Q-10 30 mg capsule     escitalopram oxalate 20 MG tablet  Commonly known as: LEXAPRO     ezetimibe 10 mg tablet  Commonly known as: ZETIA     fish oil-omega-3 fatty acids 300-1,000 mg capsule     folic acid 1 MG tablet  Commonly known as: FOLVITE     losartan 25 MG tablet  Commonly known as: COZAAR     metoprolol tartrate 25 MG tablet  Commonly known as:  LOPRESSOR     nitroGLYCERIN 0.4 MG SL tablet  Commonly known as: NITROSTAT  Place 1 tablet (0.4 mg total) under the tongue every 5 (five) minutes as needed for Chest pain.     oxybutynin 5 MG Tab  Commonly known as: DITROPAN     testosterone 1 % (50 mg/5 gram) Glpk  Commonly known as: ANDROGEL     VITAMIN B-12 1000 MCG tablet  Generic drug: cyanocobalamin     VITAMIN B-6 100 MG Tab  Generic drug: pyridoxine (vitamin B6)     vitamin D 1000 units Tab  Commonly known as: VITAMIN D3          Angela Cope, PGY-6 (Cardiology Fellow)

## 2020-10-26 ENCOUNTER — PATIENT MESSAGE (OUTPATIENT)
Dept: CARDIOLOGY | Facility: CLINIC | Age: 80
End: 2020-10-26

## 2020-10-26 LAB
POC ACTIVATED CLOTTING TIME K: 230 SEC (ref 74–137)
SAMPLE: ABNORMAL

## 2020-10-26 RX ORDER — DAPAGLIFLOZIN 10 MG/1
10 TABLET, FILM COATED ORAL DAILY
COMMUNITY
End: 2020-10-26 | Stop reason: SDUPTHER

## 2020-10-26 RX ORDER — DAPAGLIFLOZIN 10 MG/1
10 TABLET, FILM COATED ORAL DAILY
Qty: 90 TABLET | Refills: 3 | Status: SHIPPED | OUTPATIENT
Start: 2020-10-26 | End: 2021-01-07 | Stop reason: SDUPTHER

## 2020-10-28 ENCOUNTER — TELEPHONE (OUTPATIENT)
Dept: CARDIOLOGY | Facility: CLINIC | Age: 80
End: 2020-10-28

## 2020-10-30 ENCOUNTER — PATIENT MESSAGE (OUTPATIENT)
Dept: CARDIOLOGY | Facility: CLINIC | Age: 80
End: 2020-10-30

## 2020-10-30 RX ORDER — METOPROLOL SUCCINATE 25 MG/1
25 TABLET, EXTENDED RELEASE ORAL DAILY
COMMUNITY
End: 2020-11-02 | Stop reason: SDUPTHER

## 2020-11-02 ENCOUNTER — TELEPHONE (OUTPATIENT)
Dept: CARDIOLOGY | Facility: CLINIC | Age: 80
End: 2020-11-02

## 2020-11-02 RX ORDER — METOPROLOL SUCCINATE 25 MG/1
25 TABLET, EXTENDED RELEASE ORAL DAILY
Qty: 90 TABLET | Refills: 3 | Status: SHIPPED | OUTPATIENT
Start: 2020-11-02

## 2020-11-03 ENCOUNTER — TELEPHONE (OUTPATIENT)
Dept: CARDIOLOGY | Facility: CLINIC | Age: 80
End: 2020-11-03

## 2020-11-03 NOTE — TELEPHONE ENCOUNTER
I call the patient and response to his my [facility] message.  At the request of the patient I explained the purpose of viability scan again to him.  I also discussed his recent angiogram.  If the patient has viable myocardium he stated that he is interested in having PCI as early as Thursday (his a viability scan is on Wednesday).  I explained to the patient that I already have a full clinic day on Thursday and cannot do his PCI on Thursday. I explained him the earliest that it could be schedule was Friday.  The patient stated that he would like to return to [place] on Saturday.  I explained to the patient that traveling on Saturday after complex PCI on Friday is not recommended.  I offered to refer the patient to a Interventional cardiologist in [place] who could perform his complex PCI.  The patient declined and stated that he would like to have his PCI performed that [facility].  All the patient's questions were answered.

## 2020-11-04 ENCOUNTER — HOSPITAL ENCOUNTER (OUTPATIENT)
Dept: CARDIOLOGY | Facility: HOSPITAL | Age: 80
Discharge: HOME | End: 2020-11-04
Attending: INTERNAL MEDICINE
Payer: MEDICARE

## 2020-11-04 VITALS — WEIGHT: 160 LBS | BODY MASS INDEX: 25.11 KG/M2 | HEIGHT: 67 IN

## 2020-11-04 DIAGNOSIS — I25.10 CORONARY ARTERY DISEASE INVOLVING NATIVE CORONARY ARTERY OF NATIVE HEART WITHOUT ANGINA PECTORIS: ICD-10-CM

## 2020-11-04 LAB — PERFUSION DEFECT 1 SIZE IN %: 49 %

## 2020-11-04 PROCEDURE — 78459 MYOCRD IMG PET SINGLE STUDY: CPT | Mod: 26,,, | Performed by: INTERNAL MEDICINE

## 2020-11-04 PROCEDURE — 78459 MYOCRD IMG PET SINGLE STUDY: CPT

## 2020-11-04 PROCEDURE — 25000003 PHARM REV CODE 250: Performed by: INTERNAL MEDICINE

## 2020-11-04 PROCEDURE — 78459 CARDIAC PET SCAN VIABILITY (CUPID ONLY): ICD-10-PCS | Mod: 26,,, | Performed by: INTERNAL MEDICINE

## 2020-11-04 RX ADMIN — DEXTROSE MONOHYDRATE 25 G: 25 INJECTION, SOLUTION INTRAVENOUS at 01:11

## 2020-11-05 ENCOUNTER — PATIENT MESSAGE (OUTPATIENT)
Dept: CARDIOLOGY | Facility: CLINIC | Age: 80
End: 2020-11-05

## 2020-11-05 ENCOUNTER — TELEPHONE (OUTPATIENT)
Dept: CARDIOLOGY | Facility: CLINIC | Age: 80
End: 2020-11-05

## 2020-11-05 NOTE — TELEPHONE ENCOUNTER
I called the patient with the results fo his PET viability scan after reviewing the scan with Dr. Kiana Mishra who interpreted the scan. The patient's LAD territory was deemed to be non-viable.  As the patient is not experiencing anginal or CHF symptoms. Recommend guideline directed medical therapy of stable ischemic heart disease. I explained to the patient that if he were to develop anginal or CHF symptoms, then I would be happy to re-evaluate possible coronary revascularization.  All of the patient's questions were answered.

## 2021-01-07 ENCOUNTER — PATIENT MESSAGE (OUTPATIENT)
Dept: CARDIOLOGY | Facility: CLINIC | Age: 81
End: 2021-01-07

## 2021-01-07 RX ORDER — DAPAGLIFLOZIN 10 MG/1
10 TABLET, FILM COATED ORAL DAILY
Qty: 90 TABLET | Refills: 3 | Status: SHIPPED | OUTPATIENT
Start: 2021-01-07 | End: 2022-04-21

## 2021-01-21 ENCOUNTER — PATIENT MESSAGE (OUTPATIENT)
Dept: ADMINISTRATIVE | Facility: OTHER | Age: 81
End: 2021-01-21

## 2021-01-22 ENCOUNTER — PATIENT MESSAGE (OUTPATIENT)
Dept: ADMINISTRATIVE | Facility: OTHER | Age: 81
End: 2021-01-22

## 2021-02-03 ENCOUNTER — PATIENT MESSAGE (OUTPATIENT)
Dept: CARDIOLOGY | Facility: CLINIC | Age: 81
End: 2021-02-03

## 2021-02-04 ENCOUNTER — PATIENT MESSAGE (OUTPATIENT)
Dept: CARDIOLOGY | Facility: CLINIC | Age: 81
End: 2021-02-04

## 2021-03-15 ENCOUNTER — PATIENT MESSAGE (OUTPATIENT)
Dept: CARDIOLOGY | Facility: CLINIC | Age: 81
End: 2021-03-15

## 2021-03-17 DIAGNOSIS — I25.2 OLD MI (MYOCARDIAL INFARCTION): Primary | ICD-10-CM

## 2021-03-17 DIAGNOSIS — I50.9 CONGESTIVE HEART FAILURE, UNSPECIFIED HF CHRONICITY, UNSPECIFIED HEART FAILURE TYPE: ICD-10-CM

## 2021-03-29 ENCOUNTER — PATIENT MESSAGE (OUTPATIENT)
Dept: CARDIOLOGY | Facility: CLINIC | Age: 81
End: 2021-03-29

## 2021-09-08 ENCOUNTER — PATIENT MESSAGE (OUTPATIENT)
Dept: CARDIOLOGY | Facility: CLINIC | Age: 81
End: 2021-09-08

## 2022-02-11 ENCOUNTER — HOSPITAL ENCOUNTER (OUTPATIENT)
Dept: LAB | Age: 82
Discharge: HOME | End: 2022-02-11
Payer: MEDICARE

## 2022-02-11 ENCOUNTER — TRANSCRIBE ORDER (OUTPATIENT)
Dept: REGISTRATION | Age: 82
End: 2022-02-11

## 2022-02-11 DIAGNOSIS — Z01.812 PRE-PROCEDURAL LABORATORY EXAMINATIONS: ICD-10-CM

## 2022-02-11 DIAGNOSIS — Z01.812 PRE-PROCEDURAL LABORATORY EXAMINATIONS: Primary | ICD-10-CM

## 2022-02-11 LAB
SARS-COV-2, XPLCVT: NOT DETECTED
SOURCE, COVRS: NORMAL

## 2022-02-11 PROCEDURE — U0005 INFEC AGEN DETEC AMPLI PROBE: HCPCS

## 2022-03-17 ENCOUNTER — OFFICE VISIT (OUTPATIENT)
Dept: ORTHOPEDIC SURGERY | Age: 82
End: 2022-03-17
Payer: MEDICARE

## 2022-03-17 VITALS — BODY MASS INDEX: 22.76 KG/M2 | WEIGHT: 145 LBS | HEIGHT: 67 IN

## 2022-03-17 DIAGNOSIS — E11.9 CONTROLLED TYPE 2 DIABETES MELLITUS WITHOUT COMPLICATION, WITHOUT LONG-TERM CURRENT USE OF INSULIN (HCC): ICD-10-CM

## 2022-03-17 DIAGNOSIS — M79.672 LEFT FOOT PAIN: ICD-10-CM

## 2022-03-17 DIAGNOSIS — M77.42 METATARSALGIA OF LEFT FOOT: Primary | ICD-10-CM

## 2022-03-17 PROCEDURE — G8432 DEP SCR NOT DOC, RNG: HCPCS | Performed by: ORTHOPAEDIC SURGERY

## 2022-03-17 PROCEDURE — G8427 DOCREV CUR MEDS BY ELIG CLIN: HCPCS | Performed by: ORTHOPAEDIC SURGERY

## 2022-03-17 PROCEDURE — G8536 NO DOC ELDER MAL SCRN: HCPCS | Performed by: ORTHOPAEDIC SURGERY

## 2022-03-17 PROCEDURE — 1101F PT FALLS ASSESS-DOCD LE1/YR: CPT | Performed by: ORTHOPAEDIC SURGERY

## 2022-03-17 PROCEDURE — 99213 OFFICE O/P EST LOW 20 MIN: CPT | Performed by: ORTHOPAEDIC SURGERY

## 2022-03-17 PROCEDURE — G8420 CALC BMI NORM PARAMETERS: HCPCS | Performed by: ORTHOPAEDIC SURGERY

## 2022-03-17 RX ORDER — METHYLPREDNISOLONE 4 MG/1
TABLET ORAL
Qty: 1 DOSE PACK | Refills: 0 | Status: SHIPPED | OUTPATIENT
Start: 2022-03-17 | End: 2022-03-29

## 2022-03-17 NOTE — PROGRESS NOTES
Dalila Curran (: 1940) is a 78 y.o. male, patient,here for evaluation of the following   Chief Complaint   Patient presents with    Foot Pain     having pain at the ball of the left foot         ASSESSMENT/PLAN:  Below is the assessment and plan developed based on review of pertinent history, physical exam, labs, studies, and medications. 1. Metatarsalgia of left foot  2. Left foot pain  -     XR STANDING FOOT LT MIN 3 V; Future  3. Controlled type 2 diabetes mellitus without complication, without long-term current use of insulin ([facility] [address]      Patient has metatarsalgia, several reasons why that is likely the problem because of the fat pad atrophy, tender at the forefoot joints causing more pressure to the forefoot, wearing shoes that is too flexible and could be tight. Finally he is also very tight in terms of flexibility at the posterior lower extremity muscles and tendons. I recommended shoewear modification with different types of insoles, activity modification, avoid walking up heel on treadmill for now. Recommended stretching program as demonstrated today in clinic provided handout of exercises to do and also refer to physical therapist.  We will also try Medrol pack which was sent for him to pharmacy. Provided information on where to purchase the shoes and insoles recommended. No current surgical indications, activities as tolerated once the pain resolves. Return if symptoms worsen or fail to improve. No Known Allergies    No current outpatient medications on file. No current facility-administered medications for this visit. No past medical history on file. No past surgical history on file. No family history on file.     Social History     Socioeconomic History    Marital status:      Spouse name: Not on file    Number of children: Not on file    Years of education: Not on file    Highest education level: Not on file   Occupational History    Not on file Tobacco Use    Smoking status: Never Smoker    Smokeless tobacco: Never Used   Substance and Sexual Activity    Alcohol use: Not Currently    Drug use: Never    Sexual activity: Not on file   Other Topics Concern    Not on file   Social History Narrative    Not on file     Social Determinants of Health     Financial Resource Strain:     Difficulty of Paying Living Expenses: Not on file   Food Insecurity:     Worried About Running Out of Food in the Last Year: Not on file    Odin of Food in the Last Year: Not on file   Transportation Needs:     Lack of Transportation (Medical): Not on file    Lack of Transportation (Non-Medical): Not on file   Physical Activity:     Days of Exercise per Week: Not on file    Minutes of Exercise per Session: Not on file   Stress:     Feeling of Stress : Not on file   Social Connections:     Frequency of Communication with Friends and Family: Not on file    Frequency of Social Gatherings with Friends and Family: Not on file    Attends Mandaeism Services: Not on file    Active Member of [address] [place] [address] or Organizations: Not on file    Attends Club or Organization Meetings: Not on file    Marital Status: Not on file   Intimate Partner Violence:     Fear of Current or Ex-Partner: Not on file    Emotionally Abused: Not on file    Physically Abused: Not on file    Sexually Abused: Not on file   Housing Stability:     Unable to Pay for Housing in the Last Year: Not on file    Number of [place] in the Last Year: Not on file    Unstable Housing in the Last Year: Not on file           Vitals:  Ht 5' 7\" (1.702 m)   Wt 145 lb (65.8 kg)   BMI 22.71 kg/m²    Body mass index is 22.71 kg/m². SUBJECTIVE/OBJECTIVE:  Annita Walsh (: 1940)   Former patient returns today with a new problem at the left forefoot. States the ball of foot is painful with weightbearing.   After he does exercises on the treadmill he is notices the pain, if he is in bed at night gets up to walk to the bathroom or gets up in the morning, the foot is painful because it feels stiff but if he just walking generally does not seem to hurt. He feels most of the pain when he starts to do other activities after having been seated or sleeping. He does not have any swelling. He has tried treatments for plantar fasciitis in the past and got some different shoes and that really helped his symptoms this problem is a new area of pain. He has had no injuries. He does have some underlying diabetes, not taking any medications mostly controlling with exercise. Physical Exam  Pleasant well-nourished male , alert and oriented to person, time and place, no acute distress. Mostly normal gait, satisfactory weightbearing stance. Left lower extremity/ankle: Extremely tight hamstrings, gastrocsoleus complex, unable to flex forward when knee extended and ankle held at 90 degrees. The tendons are otherwise intact with negative Grayson test, negative ankle squeeze test.    Left foot: When weightbearing on floor without shoes, there is slight hyperflexion of the toes so most of the pressure is on the forefoot metatarsal heads, there is loss of plantar fat pad, tender at the metatarsal heads second, third and fourth and also the first sesamoid medial and lateral, no significant tenderness at the joints, no swelling, metatarsals nontender otherwise more proximal.  Toes nontender. Contralateral foot and ankle exam, nontender, no swelling ligaments grossly stable. Normal weightbearing stance. Neurovascular exam intact for light touch sensation, cap refill, dorsalis pedis pulse palpable, flexion/extension strength 5/5. Skin intact without open wounds, lesions or ulcers, no skin discolorations, normal warmth to skin.       Imaging:    XR Results (most recent):  Results from Appointment encounter on 03/17/22    XR STANDING FOOT LT MIN 3 V    Narrative  Left foot standing AP, lateral and oblique shows no acute fractures or dislocations, no acute abnormalities. Lateral views noted more pressure on the forefoot with fat pad atrophy, slight flexion of the toes related to slight flexion at forefoot, normal bone density, no lesions. All joints at the forefoot, midfoot and hindfoot are intact without arthritis. There is a plantar heel spur          An electronic signature was used to authenticate this note.   -- Dank Linares MD

## 2022-03-17 NOTE — LETTER
3/17/2022    Patient: Tam Fields   YOB: 1940   Date of Visit: 3/17/2022     Joyce Stone MD  [facility] [address]  Via Fax: [phone]    Dear Joyce Stone MD,      Thank you for referring Mr. Sherif Sin to [facility] for evaluation. My notes for this consultation are attached. If you have questions, please do not hesitate to call me. I look forward to following your patient along with you.       Sincerely,    Ej Reynoso MD

## 2022-03-17 NOTE — PATIENT INSTRUCTIONS
Metatarsalgia: Care Instructions  Your Care Instructions     Metatarsalgia (say \"met-uh-tar-SAL-elizabeth-uh\") is pain in the ball of the foot. It sometimes spreads to the toes. The ball of the foot is the bottom of the foot, where the toes join the foot. While walking might be very painful, the pain is usually not a sign of a serious or permanent problem. But any pain can affect your life, so it is important that you treat it. Pain in this area can be caused by many things. For example, you may have this pain if you stand or walk a lot or wear tight shoes or high heels. Your pain might be caused by inflammation of a joint (capsulitis). It is most common in the joint at the base of the second toe, near the ball of the foot. Capsulitis happens when ligaments that go around the joint become inflamed. The joint may be swollen. It may feel like there is a small rock under it. You may have had an X-ray if your doctor wanted to make sure a more serious problem is not causing your pain. Treatment may consist of home care, such as rest, wearing different shoes, and taking over-the-counter pain medicines. It can take months for the pain to go away. If the ligaments around a joint are torn, or if a toe has started to slant toward the toe next to it, you may need surgery. Follow-up care is a key part of your treatment and safety. Be sure to make and go to all appointments, and call your doctor if you are having problems. It's also a good idea to know your test results and keep a list of the medicines you take. How can you care for yourself at home? · Rest your foot. If an activity is causing the pain, find another one to do that does not put so much pressure on your foot. · Put ice or a cold pack on your foot when it hurts or after you've done something that usually causes pain. Do this for 10 to 20 minutes at a time. Put a thin cloth between the ice and your skin.   · Take an over-the-counter pain medicine, such as acetaminophen (Tylenol), ibuprofen (Advil, Motrin), or naproxen (Aleve). Be safe with medicines. Read and follow all instructions on the label. · Do not take two or more pain medicines at the same time unless the doctor told you to. Many pain medicines have acetaminophen, which is Tylenol. Too much acetaminophen (Tylenol) can be harmful. · Wear roomy, comfortable shoes. · If your doctor recommends it, use special pads to relieve the pressure on your foot. The pads may fit into your shoes, or they may stick to the soles of your feet. · Ask your doctor about using orthotic shoe devices. These are molded pieces of rubber, leather, metal, plastic, or other synthetic material that are inserted into a shoe. · Wear shoes with good arch support. · Try not to wear high heels or narrow shoes. · Follow your doctor's or physical therapist's directions for exercise. When should you call for help? Watch closely for changes in your health, and be sure to contact your doctor if:    · You have new or worse symptoms.     · You do not get better as expected. Where can you learn more? Go to http://www.gray.com/  Enter R284 in the search box to learn more about \"Metatarsalgia: Care Instructions. \"  Current as of: July 1, 2021               Content Version: 13.2  © 3101-0971 [employer]. Care instructions adapted under license by [employer] (which disclaims liability or warranty for this information). If you have questions about a medical condition or this instruction, always ask your healthcare professional. [place] [address] any warranty or liability for your use of this information. Ankle: Exercises  Introduction  Here are some examples of exercises for you to try. The exercises may be suggested for a condition or for rehabilitation. Start each exercise slowly. Ease off the exercises if you start to have pain.   You will be told when to start these exercises and which ones will work best for you. How to do the exercises  'Alphabet' exercise    1. Trace the alphabet with your toe. This helps your ankle move in all directions. Side-to-side knee swing exercise    1. Sit in a chair with your foot flat on the floor. 2. Slowly move your knee from side to side while keeping your foot pressed flat. 3. Continue this exercise for 2 to 3 minutes. Towel curl    1. While sitting, place your foot on a towel on the floor and scrunch the towel toward you with your toes. 2. Then use your toes to push the towel away from you. 3. Make this exercise more challenging by placing a weighted object, such as a soup can, on the other end of the towel. Towel stretch    1. Sit with your legs extended and knees straight. 2. Place a towel around your foot just under the toes. 3. Hold each end of the towel in each hand, with your hands above your knees. 4. Pull back with the towel so that your foot stretches toward you. 5. Hold the position for at least 15 to 30 seconds. 6. Repeat 2 to 4 times a session, up to 5 sessions a day. Ankle eversion exercise    1. Start by sitting with your foot flat on the floor and pushing it outward against an immovable object such as the wall or heavy furniture. Hold for about 6 seconds, then relax. Repeat 8 to 12 times. 2. After you feel comfortable with this, try using rubber tubing looped around the outside of your feet for resistance. Push your foot out to the side against the tubing, and then count to 10 as you slowly bring your foot back to the middle. Repeat 8 to 12 times. Isometric opposition exercises    1. While sitting, put your feet together flat on the floor. 2. Press your injured foot inward against your other foot. Hold for about 6 seconds, and relax. Repeat 8 to 12 times. 3. Then place the heel of your other foot on top of the injured one. Push down with the top heel while trying to push up with your injured foot.  Hold for about 6 seconds, and relax. Repeat 8 to 12 times. Follow-up care is a key part of your treatment and safety. Be sure to make and go to all appointments, and call your doctor if you are having problems. It's also a good idea to know your test results and keep a list of the medicines you take. Where can you learn more? Go to http://www.gray.com/  Enter R730 in the search box to learn more about \"Ankle: Exercises. \"  Current as of: July 1, 2021               Content Version: 13.2  © 5621-1311 [employer], [employer]. Care instructions adapted under license by [employer] (which disclaims liability or warranty for this information). If you have questions about a medical condition or this instruction, always ask your healthcare professional. [address] any warranty or liability for your use of this information.

## 2022-04-14 ENCOUNTER — OFFICE VISIT (OUTPATIENT)
Dept: ORTHOPEDIC SURGERY | Age: 82
End: 2022-04-14
Payer: MEDICARE

## 2022-04-14 VITALS — BODY MASS INDEX: 23.54 KG/M2 | HEIGHT: 67 IN | WEIGHT: 150 LBS

## 2022-04-14 DIAGNOSIS — M79.671 BILATERAL FOOT PAIN: Primary | ICD-10-CM

## 2022-04-14 DIAGNOSIS — M79.672 BILATERAL FOOT PAIN: Primary | ICD-10-CM

## 2022-04-14 DIAGNOSIS — M79.89 SWELLING OF RIGHT FOOT: ICD-10-CM

## 2022-04-14 PROCEDURE — G8432 DEP SCR NOT DOC, RNG: HCPCS | Performed by: ORTHOPAEDIC SURGERY

## 2022-04-14 PROCEDURE — G8420 CALC BMI NORM PARAMETERS: HCPCS | Performed by: ORTHOPAEDIC SURGERY

## 2022-04-14 PROCEDURE — G8536 NO DOC ELDER MAL SCRN: HCPCS | Performed by: ORTHOPAEDIC SURGERY

## 2022-04-14 PROCEDURE — G8427 DOCREV CUR MEDS BY ELIG CLIN: HCPCS | Performed by: ORTHOPAEDIC SURGERY

## 2022-04-14 PROCEDURE — 99213 OFFICE O/P EST LOW 20 MIN: CPT | Performed by: ORTHOPAEDIC SURGERY

## 2022-04-14 PROCEDURE — 1101F PT FALLS ASSESS-DOCD LE1/YR: CPT | Performed by: ORTHOPAEDIC SURGERY

## 2022-04-14 NOTE — PROGRESS NOTES
Adalberto Holder (: 1940) is a 78 y.o. male, patient,here for evaluation of the following   Chief Complaint   Patient presents with    Foot Pain     he states that he is having pain in the ball of his left foot and his right foot has pain and swelling still he has been seen in the past         ASSESSMENT/PLAN:  Below is the assessment and plan developed based on review of pertinent history, physical exam, labs, studies, and medications. 1. Bilateral foot pain  -     XR STANDING FOOT BI COMP 3 V; Future  -     REFERRAL TO PHYSICAL THERAPY  2. Swelling of right foot  -     XR STANDING FOOT BI COMP 3 V; Future  -     REFERRAL TO PHYSICAL THERAPY    The patient now has bilateral foot pain and was initially seen for left foot pain at the forefoot only approximately 3 weeks ago 2022. He was treated for metatarsalgia and this was notable tight muscles and tendons of lower extremity and fat pad atrophy forefoot and nothing else was seen on x-rays or exam.  Today he presents also with right foot pain and swelling and there is some erythema and [facility] who has prescribed antibiotics for him which he is currently taking. He has had uric acid test completed and that is mostly normal at 5.1, his A1c is 6.5. He does have diabetes which is otherwise well controlled and this does not appear to be Charcot arthropathy. I had also recommended physical therapy in the past but patient did not attend therapy so new referral is made today. He will complete antibiotic treatment, but if symptoms do not improve or worsen over the next 1 to 2 weeks, I would like to obtain labs to include CRP, SED, CBC with differential and consider obtaining MRI of either the right or left foot or both. Left foot is chronic pain of unknown etiology which I suspect it was possible metatarsalgia but is not getting better. The right foot because he has swelling and erythema.   At length discussion had with patient. He wanted x-ray copies on disc, we referred him to the appropriate staff to have that copy for him. Return in about 2 weeks (around 4/28/2022) for Or return after labs and/or MRI studies completed. No Known Allergies    No current outpatient medications on file. No current facility-administered medications for this visit. History reviewed. No pertinent past medical history. History reviewed. No pertinent surgical history. History reviewed. No pertinent family history. Social History     Socioeconomic History    Marital status:      Spouse name: Not on file    Number of children: Not on file    Years of education: Not on file    Highest education level: Not on file   Occupational History    Not on file   Tobacco Use    Smoking status: Never Smoker    Smokeless tobacco: Never Used   Substance and Sexual Activity    Alcohol use: Not Currently    Drug use: Never    Sexual activity: Not on file   Other Topics Concern    Not on file   Social History Narrative    Not on file     Social Determinants of Health     Financial Resource Strain:     Difficulty of Paying Living Expenses: Not on file   Food Insecurity:     Worried About Running Out of Food in the Last Year: Not on file    [place] of Food in the Last Year: Not on file   Transportation Needs:     Lack of Transportation (Medical): Not on file    Lack of Transportation (Non-Medical):  Not on file   Physical Activity:     Days of Exercise per Week: Not on file    Minutes of Exercise per Session: Not on file   Stress:     Feeling of Stress : Not on file   Social Connections:     Frequency of Communication with Friends and Family: Not on file    Frequency of Social Gatherings with Friends and Family: Not on file    Attends Baptism Services: Not on file    Active Member of Clubs or Organizations: Not on file    Attends Club or Organization Meetings: Not on file    Marital Status: Not on file   Intimate Partner Violence:     Fear of Current or Ex-Partner: Not on file    Emotionally Abused: Not on file    Physically Abused: Not on file    Sexually Abused: Not on file   Housing Stability:     Unable to Pay for Housing in the Last Year: Not on file    Number of [place] in the Last Year: Not on file    Unstable Housing in the Last Year: Not on file           Vitals:  Ht 5' 7\" (1.702 m)   Wt 150 lb (68 kg)   BMI 23.49 kg/m²    Body mass index is 23.49 kg/m². ROS     Positive for: Musculoskeletal (bilateral foot)    Negative for: Constitutional, Gastrointestinal, Neurological, Skin, Genitourinary, HENT, Endocrine, Cardiovascular, Eyes, Respiratory, Psychiatric, Allergic/Imm, Heme/Lymph    Last edited by Sharyon Severance on 2022  2:31 PM. (History)              SUBJECTIVE/OBJECTIVE:  Robert Rose (: 1940) returns for reexamination of the left foot and at initial evaluation 2022, he was complaining of left forefoot pain only and had been diagnosed with metatarsalgia with associated plantar fat pad atrophy at the left forefoot. He was informed of appropriate shoe wear and insoles and therapy program since he had significant tightness to dorsiflexion at left lower extremity and decreased flexibility. However the left forefoot is not getting better now he has a new problem at the right foot where he has little bit of swelling and redness to the area and pain also there. He denies any fevers or chills. Denies any injuries. He is diabetic but it is well controlled diabetes with an A1c of 6.5. He also had labs obtained for uric aspect and that was around 5.1. His pain currently is moderate stabbing pain that interferes with sleep but comes and goes and there is some swelling now at the right foot which was not present before. When last seen in March she did not have right foot symptoms. Standing and walking make it worse.   He has apparently been treated with antibiotic (amoxicillin) by other provider for the right foot cellulitis as recent as April 12, 2022. Physical Exam  Pleasant well-nourished male , alert and oriented to person, time and place, no acute distress. Antalgic gait, satisfactory weightbearing stance. Bilateral ankle: Nontender, no swelling, ligaments stable, no swelling around the ankle, ligaments stable. Bilateral foot: Left foot there is no cellulitis, no swelling just tenderness on the left forefoot at the forefoot metatarsal heads where he also has the plantar fat pad atrophy. There is no crepitus, no ecchymosis, normal exam and appearance. Right foot has some cellulitis and mild swelling, diffuse tenderness over the forefoot metatarsals. No open wounds or lesions. The foot is normal warmth, its not increased as would be expected with infection. No signs of Charcot arthropathy. Neurovascular exam intact for light touch sensation, cap refill, dorsalis pedis pulse palpable, flexion/extension strength 5/5. Skin intact without open wounds, lesions or ulcers, no skin discolorations, normal warmth to skin. Imaging:    XR Results (most recent):  Results from Appointment encounter on 04/14/22    XR STANDING FOOT BI COMP 3 V    Narrative  Bilateral foot AP, lateral and oblique standing x-rays show no acute fractures or dislocations at the left foot, fat pad atrophy remains the same. The right foot also reviewed thoroughly and I did not see an obvious fracture dislocation but there is mild soft tissue swelling present. No signs of Charcot arthropathy. There is normal bone density, no lesions. An electronic signature was used to authenticate this note.   -- Alina Tam MD

## 2022-04-14 NOTE — LETTER
4/14/2022    Patient: Reyes Cheney   YOB: 1940   Date of Visit: 4/14/2022     Addie Lino MD  [address] [place] [id number]  Via Fax: [phone]    Dear Addie Lino MD,      Thank you for referring Mr. Hiro Ricardo to [facility] for evaluation. My notes for this consultation are attached. If you have questions, please do not hesitate to call me. I look forward to following your patient along with you.       Sincerely,    Blanca Giron MD

## 2023-05-22 RX ORDER — DAPAGLIFLOZIN 10 MG/1
TABLET, FILM COATED ORAL
Qty: 90 TABLET | Refills: 3 | Status: SHIPPED | OUTPATIENT
Start: 2023-05-22

## (undated) DEVICE — KIT GLIDESHEATH SLEND 6FR 10CM

## (undated) DEVICE — SEE MEDLINE ITEM 156894

## (undated) DEVICE — SPIKE CONTRAST CONTROLLER

## (undated) DEVICE — HEMOSTAT VASC BAND REG 24CM

## (undated) DEVICE — GUIDE LAUNCHER 6FR EBU 3.5

## (undated) DEVICE — PROTECTION STATION PLUS

## (undated) DEVICE — CATH JACKY RADIAL 5FR 100CM

## (undated) DEVICE — OMNIPAQUE 350 200ML

## (undated) DEVICE — KIT CUSTOM MANIFOLD

## (undated) DEVICE — SEE MEDLINE ITEM 157187

## (undated) DEVICE — GUIDEWIRE VERRATA + JTIP 185CM

## (undated) DEVICE — WIRE GUIDE SAFE-T-J .035 260CM

## (undated) DEVICE — GUIDEWIRE STF .035X180CM ANG

## (undated) DEVICE — KIT CO-PILOT